# Patient Record
Sex: FEMALE | Race: BLACK OR AFRICAN AMERICAN
[De-identification: names, ages, dates, MRNs, and addresses within clinical notes are randomized per-mention and may not be internally consistent; named-entity substitution may affect disease eponyms.]

---

## 2021-01-25 ENCOUNTER — HOSPITAL ENCOUNTER (EMERGENCY)
Dept: HOSPITAL 53 - M ED | Age: 21
Discharge: HOME | End: 2021-01-25
Payer: COMMERCIAL

## 2021-01-25 VITALS — BODY MASS INDEX: 28.68 KG/M2 | HEIGHT: 64 IN | WEIGHT: 167.99 LBS

## 2021-01-25 VITALS — SYSTOLIC BLOOD PRESSURE: 142 MMHG | DIASTOLIC BLOOD PRESSURE: 75 MMHG

## 2021-01-25 DIAGNOSIS — N30.00: Primary | ICD-10-CM

## 2021-01-25 LAB — HCG UR QL: NEGATIVE

## 2021-02-11 ENCOUNTER — HOSPITAL ENCOUNTER (EMERGENCY)
Dept: HOSPITAL 53 - M ED | Age: 21
Discharge: HOME | End: 2021-02-11
Payer: COMMERCIAL

## 2021-02-11 VITALS — SYSTOLIC BLOOD PRESSURE: 133 MMHG | DIASTOLIC BLOOD PRESSURE: 76 MMHG

## 2021-02-11 VITALS — HEIGHT: 64 IN | WEIGHT: 164.24 LBS | BODY MASS INDEX: 28.04 KG/M2

## 2021-02-11 DIAGNOSIS — Z3A.00: ICD-10-CM

## 2021-02-11 DIAGNOSIS — O23.11: Primary | ICD-10-CM

## 2021-02-11 LAB
CHLAMYDIA DNA AMPLIFICATION: NEGATIVE
N GONORRHOEA RRNA SPEC QL NAA+PROBE: NEGATIVE

## 2021-02-11 NOTE — CCD
Summarization Of Episode

                             Created on: 2021



RACHELL DAMEON TRAVIS

External Reference #: [id number]

: 2000

Sex: Female



Demographics





                          Address                   [address] [place]  [address]

 

                          Home Phone                [phone]

 

                          Preferred Language        English

 

                          Marital Status            

 

                          Ep[employer]opalian Affiliation     [employer]ology

 

                          Race                      Black or 

 

                          Ethnic Group              Not  or 





Author





                          Author                    [facility]

 

                          Organization              [facility]

 

                          Address                   Unknown

 

                          Phone                     Unavailable







Support





                Name            Relationship    Address         Phone

 

                    [facility]             Next Of Kin         [address]

[place]

[place]  [address]                    Unavailable

 

                    SOBEIDA KHALIL      Next Of Kin         [address]



[place]  [address] [phone]

 

                    JAYDA LOVETT Next Of Kin         [address] [address] [place] [address]

[place] 

[place]  [address] [phone]







Care Team Providers





                    Care Team Member Name Role                Phone

 

                    EDWARD LARA MD Unavailable         Unavailable

 

                    EDWARD LARA MD Unavailable         Unavailable



                                  



Re-disclosure Warning

          The records that you are about to access may contain information from 
federally-assisted alcohol or drug abuse programs. If such information is 
present, then the following federally mandated warning applies: This information
has been disclosed to you from records protected by federal confidentiality 
rules (42 CFR part 2). The federal rules prohibit you from making any further 
disclosure of this information unless further disclosure is expressly permitted 
by the written consent of the person to whom it pertains or as otherwise 
permitted by 42 CFR part 2. A general authorization for the release of medical 
or other information is NOT sufficient for this purpose. The Federal rules 
restrict any use of the information to criminally investigate or prosecute any 
alcohol or drug abuse patient.The records that you are about to access may 
contain highly sensitive health information, the redisclosure of which is 
protected by Article 27-F of the [place] Public Health law. If you 
continue you may have access to information: Regarding HIV / AIDS; Provided by 
facilities licensed or operated by the [facility] Office of Mental Health; 
or Provided by the [facility] Office for People With Developmental 
Disabilities. If such information is present, then the following [place] 
mandated warning applies: This information has been disclosed to you from 
confidential records which are protected by state law. State law prohibits you 
from making any further disclosure of this information without the specific 
written consent of the person to whom it pertains, or as otherwise permitted by 
law. Any unauthorized further disclosure in violation of state law may result in
a fine or skilled nursing sentence or both. A general authorization for the release of 
medical or other information is NOT sufficient authorization for further disc
losure.                                                                         
    



Encounters

          



           Encounter  Providers  Location   Date       Indications Data Source(s

)

 

                Outpatient      Attender: EDWARD LARA MD                 2020 01:12:00 PM EDT - 

2020 02:12:00 PM EDT                           [facility]

 

                                        Patient discharged. 



                                                                    



Insurance Providers

          



             Payer name   Policy type / Coverage type Policy ID    Covered party

 ID Covered 

party's relationship to aguilar Policy Aguilar             Plan Information

 

          [facility] [employer]           [id number]           SP             

     [id number]

 

          [facility] [employer] - O/P           [id number]           18            

      [id number]



                                                                                
                 



Problems, Conditions, and Diagnoses

          



           Code       Display Name Description Problem Type Effective Dates Data

 Source(s)

 

           W76113     Pain in left hip Pain in left hip Diagnosis  2020 01

:12:00 PM EDT 

[facility]

 

           H68118     Pain in right hip Pain in right hip Diagnosis  2020 

01:12:00 PM EDT 

[facility]



                                                                                
                 



Results

          



                    ID                  Date                Data Source

 

                    [id number]     2020 10:39:00 [place] [address] PHONE: [phone]

FAX: [phone]  Name .................. : RACHELL MONTELONGO        [facility].................. : [id number] ROOM. ................. :                   
           Number ................... : [id number] Stay type ............. : O/P  
                       Discharge Date......... ... : 20 Admit Date .......
.. : 20                        Admit Phys .................... : MARCO HURT Date of Birth ....... : 2000                     Family Phys 
................... : UNKNOWN CO Phone .................. : [phone]        
       Age ................................ : 20 Film# .................. 
.:[id number]                      Sex ................................. : F  
Unsigned transcriptions are preliminary reports and do not represent a medical 
or legal document     MRI LOWER EXT ANY JT W CONT [id number] COMPLETE:20 
14:45 [facility] [address]                          (REASON FOR PROCESS: PAIN     MRI OF 
THE LEFT HIP WITH CONTRAST:  INDICATION: Pain.  FINDINGS: There is no evidence 
of fracture or dislocation.  There is no abnormal bone marrow signal visualized.
 The joint space is well preserved. No focal hyaline cartilage defect. There is 
no evidence of a labral tear.  No muscular tear or contusion. No evidence of 
ligamentous injury or capsular injury.  IMPRESSION: Essentially normal left hip 
MR arthrogram.   ___________________________________ Electronically Reviewed and
Signed By Conrado Ritter M.D.              , 20 10:39, [facility]  Transcribe 
Initials: TAMMY , Transcribe Date: 20 21:55, Dictation Date:   Copy for: 
MARCO LEON Copy for: [address] [place] [address] REC                                           
                                                      Page 1 of 1   









          Name      Value     Range     Interpretation Code Description Data Nikki

rce(s) Supporting 

Document(s)

 

                                                                       









                    ID                  Date                Data Source

 

                    [id number]     2020 10:38:00 AM EDT [facility] [address] [place] [address] PHONE: [phone]

FAX: [phone]  Name .................. : RACHELL MONTELONGO        Acct 
Number.................. : [id number] ROOM. ................. :                   
          MR Number ................... : [id number] Stay type ............. : O/P  
                       Discharge Date......... ... : 20 Admit Date .......
.. : 20                        Admit Phys .................... : LARA 
COR Date of Birth ....... : 2000                     Family Phys 
................... : UNKNOWN CO Phone .................. : [phone]        
       Age ................................ : 20 Film# .................. 
.:[id number]                      Sex ................................. : F  
Unsigned transcriptions are preliminary reports and do not represent a medical 
or legal document     MRI LOWER EXT ANY JT W CONT R [id number] COMPLETE:20 
15:06 [facility] [id number]                           (REASON FOR PROCESS: PAIN     MRI OF 
THE RIGHT HIP WITH CONTRAST:  INDICATION: Pain.  FINDINGS: There is no evidence 
of fracture or dislocation. There is no abnormal bone marrow signal visualized. 
The joint space is well preserved. No focal hyaline cartilage defect. There is 
no evidence of a labral tear.  No muscular tear or contusion. No evidence of 
ligamentous injury or capsular injury.  IMPRESSION: Essentially normal right hip
MR arthrogram.   ___________________________________ Electronically Reviewed and
Signed By Conrado Ritter M.D.              , 20 10:38, [facility]  Transcribe 
Initials: DZ , Transcribe Date: 20 21:53, Dictation Date:   Copy for: 
MARCO LEON Copy for: [address] [place] [address] REC                                           
                                                      Page 1 of 1   









          Name      Value     Range     Interpretation Code Description Data Nikki

rce(s) Supporting 

Document(s)

 

                                                                       









                    ID                  Date                Data Source

 

                    [id number]     2020 09:51:00 AM EDT [facility] [address] [place] [address] PHONE: [phone]

FAX: [phone]  Name .................. : RACHELL MONTELONGO        Acct 
Number.................. : [id number] ROOM. ................. :                   
          MR Number ................... : [id number] Stay type ............. : O/P  
                       Discharge Date......... ... : 20 Admit Date .......
.. : 20                        Admit Phys .................... : LARA 
COR Date of Birth ....... : 2000                     Family Phys 
................... : UNKNOWN CO Phone .................. : [phone]        
       Age ................................ : 20 Film# .................. 
.:[id number]                      Sex ................................. : F  
Unsigned transcriptions are preliminary reports and do not represent a medical 
or legal document       INJECTION FOR HIP ARTHROGRAM 13746   COMPLETE:20 
13:20                    [id number]                             REASON FOR EXAM: PAIN
      INJECTION FOR HIP ARTHROGRAM 54279   COMPLETE:20 13:20              
     [id number]                             REASON FOR EXAM: PAIN     FLUOROSCOPIC 
EXAMINATION OF THE BILATERAL HIPS FOR ARTHROGRAM, 20:  PROCEDURE: The 
benefits and risks of the examination were discussed with the patient. The 
patient has given informed consent for the procedure.  A time out was performed 
confirming the bilateral hips are the proper hip for today's examination.  The 
skin surface was marked using fluoroscopic guidance.  The skin was prepped and 
dressed in normal sterile fashion.  Superficial and deep Lidocaine 
administration was performed with a 25-gauge needle. A 22- gauge spinal needle 
was then placed and advanced under fluoroscopic guidance. The needle was passed 
into the joint space at which time iodinated contrast was administered to 
confirm proper placement. Approximately 5 cc of iodinated contrast was 
administered. Once the proper placement was confirmed, approximately 10 cc of 
1:200 Gadolinium solution was administered. The needle was then removed. The 
skin was cleansed and bandaged.  No complications were experienced during the 
procedure.  Seven images were obtained and fluoro time was 6 seconds.  
Examination dictated by RONIT Gonzales. Examination was reviewed with 
Nitesh Cortez MD, radiologist at the time of this dictation.               
                                                                                
  Page 1 of 2 [facility] [address] [place] [address] PHONE:
[phone] FAX: [phone]  Name .................. : RACHELLJULIAN MONTELONGO 
      [id number] RO[facility]. ................. :        
                      Number ................... : [id number] Stay type 
............. : O/P                          Discharge Date......... ... : 
20 Admit Date ......... : 20                        Admit Phys 
.................... : MARCO HURT Date of Birth ....... : 2000            
        Family Phys ................... : UNKNOWN CO Phone .................. : 
[phone]                Age ................................ : 20 Film# 
.................. .:[id number]                      Sex 
................................. : F  Unsigned transcriptions are preliminary 
reports and do not represent a medical or legal document       INJECTION FOR HIP
ARTHROGRAM 94454   COMPLETE:20 13:20                    [address]654             
               REASON FOR EXAM: PAIN       INJECTION FOR HIP ARTHROGRAM 62930   
COMPLETE:20 13:20                    01650                             
REASON FOR EXAM: PAIN    ___________________________________ Electronically 
Reviewed and Signed By NITESH CORTEZ MD                        , 20 
09:51, [facility] [place] [facility]  Transcribe Initials: SSR, Transcribe Date: 20 14:05, Dictation
Date:   Copy for: MARCO LEON Copy for: [facility] REC                         
                                                                         Page 2 
of 2   









          Name      Value     Range     Interpretation Code Description Data Nikki

rce(s) Supporting 

Document(s)

 

                                                                       









                    ID                  Date                Data Source

 

                    [id number]     2020 09:51:00 AM EDT [facility] [address]  [place] [address] PHONE: [phone]

FAX: [phone]  Name .................. : RACHELL MONTELONGO        Acct 
Number.................. : [id number] ROOM. ................. :                   
          MR Number ................... : [id number] Stay type ............. : O/P  
                       Discharge Date......... ... : 20 Admit Date .......
.. : 20                        Admit Phys .................... : LARA 
COR Date of Birth ....... : 2000                     Family Phys 
................... : UNKNOWN CO Phone .................. : [phone]        
       Age ................................ : 20 Film# .................. 
.:[id number]                      Sex ................................. : F  
Unsigned transcriptions are preliminary reports and do not represent a medical 
or legal document       INJECTION FOR HIP ARTHROGRAM 57108   COMPLETE:20 
13:20                    10860                             REASON FOR EXAM: PAIN
      INJECTION FOR HIP ARTHROGRAM 76035   COMPLETE:20 13:20              
     [address]240                             REASON FOR EXAM: PAIN     FLUOROSCOPIC 
EXAMINATION OF THE BILATERAL HIPS FOR ARTHROGRAM, 20:  PROCEDURE: The 
benefits and risks of the examination were discussed with the patient. The 
patient has given informed consent for the procedure.  A time out was performed 
confirming the bilateral hips are the proper hip for today's examination.  The 
skin surface was marked using fluoroscopic guidance.  The skin was prepped and 
dressed in normal sterile fashion.  Superficial and deep Lidocaine 
administration was performed with a 25-gauge needle. A 22- gauge spinal needle 
was then placed and advanced under fluoroscopic guidance. The needle was passed 
into the joint space at which time iodinated contrast was administered to 
confirm proper placement. Approximately 5 cc of iodinated contrast was 
administered. Once the proper placement was confirmed, approximately 10 cc of 
1:200 Gadolinium solution was administered. The needle was then removed. The 
skin was cleansed and bandaged.  No complications were experienced during the 
procedure.  Seven images were obtained and fluoro time was 6 seconds.  
Examination dictated by RONIT Gonzales. Examination was reviewed with 
Nitesh Cortez MD, radiologist at the time of this dictation.               
                                                                                
  Page 1 of 2 [facility] [address] [place] [address] PHONE:
[phone] FAX: [phone]  Name .................. : RACHELL MONTELONGO 
      Acct Number.................. : [id number] ROOM. ................. :        
                     MR Number ................... : [id number] Stay type 
............. : O/P                          Discharge Date......... ... : 
20 Admit Date ......... : 20                        Admit Phys 
.................... : LARA COR Date of Birth ....... : 2000            
        Family Phys ................... : UNKNOWN CO Phone .................. : 
[phone]                Age ................................ : 20 Film# 
.................. .:[id number]                      Sex 
................................. : F  Unsigned transcriptions are preliminary 
reports and do not represent a medical or legal document       INJECTION FOR HIP
ARTHROGRAM 06946   COMPLETE:20 13:20                    [address]34             
               REASON FOR EXAM: PAIN       INJECTION FOR HIP ARTHROGRAM 73220   
COMPLETE:20 13:20                    13913                             
REASON FOR EXAM: PAIN    ___________________________________ Electronically 
Reviewed and Signed By NITESH CORTEZ MD                        , 20 
09:51, [facility]  Transcribe Initials: SSR, Transcribe Date: 20 14:05, Dictation
Date:   Copy for: MARCO LEON Copy for: [facility] MED REC                         
                                                                         Page 2 
of 2   









          Name      Value     Range     Interpretation Code Description Data Nikki

rce(s) Supporting 

Document(s)

 

                                                                       







                                        Procedure

## 2021-02-11 NOTE — CCD
Summarization Of Episode

                             Created on: 2021



DAMEON LOVETT

External Reference #: [id number]

: 2000

Sex: Female



Demographics





                          Address                   [address]

[place]  [address]

 

                          Home Phone                [phone]

 

                          Preferred Language        English

 

                          Marital Status            

 

                          Jewish Affiliation     Buddhist

 

                          Race                      Black or 

 

                          Ethnic Group              Not  or 





Author





                          Author                    [facility]

 

                          Organization              [facility]

 

                          Address                   Unknown

 

                          Phone                     Unavailable







Support





                Name            Relationship    Address         Phone

 

                    [facility]             Next Of Kin         [address]

[place]

[place]  [address]                    Unavailable

 

                    SOBEIDA KHALIL      Next Of Kin         [address]



[place]  [address] [phone]

 

                    JAYDA LOVETT Next Of Kin         [address]

[place]  [address] [phone]







Care Team Providers





                    Care Team Member Name Role                Phone

 

                    EDWARD LARA MD Unavailable         Unavailable

 

                    EDWARD LARA MD Unavailable         Unavailable



                                  



Re-disclosure Warning

          The records that you are about to access may contain information from 
federally-assisted alcohol or drug abuse programs. If such information is 
present, then the following federally mandated warning applies: This information
has been disclosed to you from records protected by federal confidentiality 
rules (42 CFR part 2). The federal rules prohibit you from making any further 
disclosure of this information unless further disclosure is expressly permitted 
by the written consent of the person to whom it pertains or as otherwise 
permitted by 42 CFR part 2. A general authorization for the release of medical 
or other information is NOT sufficient for this purpose. The Federal rules 
restrict any use of the information to criminally investigate or prosecute any 
alcohol or drug abuse patient.The records that you are about to access may 
contain highly sensitive health information, the redisclosure of which is 
protected by Article 27-F of the [facility] Public Health law. If you 
continue you may have access to information: Regarding HIV / AIDS; Provided by 
facilities licensed or operated by the [facility] Office of Mental Health; 
or Provided by the [facility] Office for People With Developmental 
Disabilities. If such information is present, then the following [place] 
mandated warning applies: This information has been disclosed to you from 
confidential records which are protected by state law. State law prohibits you 
from making any further disclosure of this information without the specific 
written consent of the person to whom it pertains, or as otherwise permitted by 
law. Any unauthorized further disclosure in violation of state law may result in
a fine or group home sentence or both. A general authorization for the release of 
medical or other information is NOT sufficient authorization for further disc
losure.                                                                         
    



Encounters

          



           Encounter  Providers  Location   Date       Indications Data Source(s

)

 

                Outpatient      Attender: EDWARD LARA MD                 2020 01:12:00 PM EDT - 

2020 02:12:00 PM EDT                           [facility]

 

                                        Patient discharged. 



                                                                    



Insurance Providers

          



             Payer name   Policy type / Coverage type Policy ID    Covered party

 ID Covered 

party's relationship to aguilar Policy Aguilar             Plan Information

 

          [facility] [employer]           [id number]           SP             

     [id number]

 

          [facility] [employer] - O/P           [id number]           [id number]            

      [id number]



                                                                                
                 



Problems, Conditions, and Diagnoses

          



           Code       Display Name Description Problem Type Effective Dates Data

 Source(s)

 

           W11564     Pain in left hip Pain in left hip Diagnosis  2020 01

:12:00 PM EDT 

[facility]

 

           T08545     Pain in right hip Pain in right hip Diagnosis  2020 

01:12:00 PM EDT 

[facility]



                                                                                
                 



Results

          



                    ID                  Date                Data Source

 

                    [id number]     2020 10:39:00 [place] [address] PHONE: [phone]

FAX: [phone]  Name .................. : RACHELL MONTELONGO        Acct 
Number.................. : [id number] ROOM. ................. :                   
           Number ................... : [id number] Stay type ............. : O/P  
                       Discharge Date......... ... : 20 Admit Date .......
.. : 20                        Admit Phys .................... : MARCO HURT Date of Birth ....... : 2000                     Family Phys 
................... : UNKNOWN CO Phone .................. : [phone]        
       Age ................................ : 20 Film# .................. 
.:[id number]                      Sex ................................. : F  
Unsigned transcriptions are preliminary reports and do not represent a medical 
or legal document     MRI LOWER EXT ANY JT W CONT [id number] COMPLETE:20 
14:45 [facility] [address]                          (REASON FOR PROCESS: PAIN     MRI OF 
THE LEFT HIP WITH CONTRAST:  INDICATION: Pain.  FINDINGS: There is no evidence 
of fracture or dislocation.  There is no abnormal bone marrow signal visualized.
 The joint space is well preserved. No focal hyaline cartilage defect. There is 
no evidence of a labral tear.  No muscular tear or contusion. No evidence of 
ligamentous injury or capsular injury.  IMPRESSION: Essentially normal left hip 
MR arthrogram.   ___________________________________ Electronically Reviewed and
Signed By Conrado Ritter M.D.              , 20 10:39, [place]  Transcribe 
Initials: TAMMY , Transcribe Date: 20 21:55, Dictation Date:   Copy for: 
MARCO LEON Copy for: [address] [place] [address] REC                                           
                                                      Page 1 of 1   









          Name      Value     Range     Interpretation Code Description Data Nikki

rce(s) Supporting 

Document(s)

 

                                                                       









                    ID                  Date                Data Source

 

                    [id number]     2020 10:38:00 AM EDT [facility] [address] [place] [address] PHONE: [phone]

FAX: [phone]  Name .................. : RACHELL MONTELONGO        Acct 
Number.................. : [id number] ROOM. ................. :                   
          MR Number ................... : [id number] Stay type ............. : O/P  
                       Discharge Date......... ... : 20 Admit Date .......
.. : 20                        Admit Phys .................... : LARA 
COR Date of Birth ....... : 2000                     Family Phys 
................... : UNKNOWN CO Phone .................. : [phone]        
       Age ................................ : 20 Film# .................. 
.:[id number]                      Sex ................................. : F  
Unsigned transcriptions are preliminary reports and do not represent a medical 
or legal document     MRI LOWER EXT ANY JT W CONT R [id number] COMPLETE:20 
15:06 [facility] [address]                           (REASON FOR PROCESS: PAIN     MRI OF 
THE RIGHT HIP WITH CONTRAST:  INDICATION: Pain.  FINDINGS: There is no evidence 
of fracture or dislocation. There is no abnormal bone marrow signal visualized. 
The joint space is well preserved. No focal hyaline cartilage defect. There is 
no evidence of a labral tear.  No muscular tear or contusion. No evidence of 
ligamentous injury or capsular injury.  IMPRESSION: Essentially normal right hip
MR arthrogram.   ___________________________________ Electronically Reviewed and
Signed By Conrado Ritter M.D.              , 20 10:38, [place]  Transcribe 
Initials: DZ , Transcribe Date: 20 21:53, Dictation Date:   Copy for: 
MARCO LEON Copy for: [address] [place] [address] REC                                           
                                                      Page 1 of 1   









          Name      Value     Range     Interpretation Code Description Data Nikki

rce(s) Supporting 

Document(s)

 

                                                                       









                    ID                  Date                Data Source

 

                    [id number]     2020 09:51:00 AM EDT [facility] [address] [place] [address] PHONE: [phone]

FAX: [phone]  Name .................. : RACHELL MONTELONGO        Acct 
Number.................. : [id number] ROOM. ................. :                   
          MR Number ................... : [id number] Stay type ............. : O/P  
                       Discharge Date......... ... : 20 Admit Date .......
.. : 20                        Admit Phys .................... : LARA 
LICHA Date of Birth ....... : 2000                     Family Phys 
................... : UNKNOWN CO Phone .................. : [phone]        
       Age ................................ : 20 Film# .................. 
.:[id number]                      Sex ................................. : F  
Unsigned transcriptions are preliminary reports and do not represent a medical 
or legal document       INJECTION FOR HIP ARTHROGRAM 00303   COMPLETE:20 
13:20                    27351                             REASON FOR EXAM: PAIN
      INJECTION FOR HIP ARTHROGRAM 17300   COMPLETE:20 13:20              
     [id number]                             REASON FOR EXAM: PAIN     FLUOROSCOPIC 
EXAMINATION OF THE BILATERAL HIPS FOR ARTHROGRAM, 20:  PROCEDURE: The 
benefits and risks of the examination were discussed with the patient. The 
patient has given informed consent for the procedure.  A time out was performed 
confirming the bilateral hips are the proper hip for today's examination.  The 
skin surface was marked using fluoroscopic guidance.  The skin was prepped and 
dressed in normal sterile fashion.  Superficial and deep Lidocaine 
administration was performed with a 25-gauge needle. A 22- gauge spinal needle 
was then placed and advanced under fluoroscopic guidance. The needle was passed 
into the joint space at which time iodinated contrast was administered to 
confirm proper placement. Approximately 5 cc of iodinated contrast was 
administered. Once the proper placement was confirmed, approximately 10 cc of 
1:200 Gadolinium solution was administered. The needle was then removed. The 
skin was cleansed and bandaged.  No complications were experienced during the 
procedure.  Seven images were obtained and fluoro time was 6 seconds.  
Examination dictated by RONIT Gonzales. Examination was reviewed with 
Nitesh Cortez MD, radiologist at the time of this dictation.               
                                                                                
  Page 1 of 2 [place] [address] PHONE:
[phone] FAX: [phone]  Name .................. : RACHELLJULIAN MONTELONGO 
      [id number]. ................. :        
                      Number ................... : [id number] Stay type 
............. : O/P                          Discharge Date......... ... : 
20 Admit Date ......... : 20                        Admit Phys 
.................... : MARCO HURT Date of Birth ....... : 2000            
        Family Phys ................... : UNKNOWN CO Phone .................. : 
[phone]                Age ................................ : 20 Film# 
.................. .:[id number]                      Sex 
................................. : F  Unsigned transcriptions are preliminary 
reports and do not represent a medical or legal document       INJECTION FOR HIP
ARTHROGRAM 70094   COMPLETE:20 13:20                    98092             
               REASON FOR EXAM: PAIN       INJECTION FOR HIP ARTHROGRAM 57569   
COMPLETE:20 13:20                    71372                             
REASON FOR EXAM: PAIN    ___________________________________ Electronically 
Reviewed and Signed By NITESH CORTEZ MD                        , 20 
09:51, [facility]  Transcribe Initials: SSR, Transcribe Date: 20 14:05, Dictation
Date:   Copy for: MARCO LEON Copy for: [address] [place] [address] REC                         
                                                                         Page 2 
of 2   









          Name      Value     Range     Interpretation Code Description Data Nikki

rce(s) Supporting 

Document(s)

 

                                                                       









                    ID                  Date                Data Source

 

                    [id number]     2020 09:51:00 AM EDT [facility] [address]  [place] [address] PHONE: [phone]

FAX: [phone]  Name .................. : RACHELL DAMEON MONTELONGO        Acct 
Number.................. : [id number] ROOM. ................. :                   
          MR Number ................... : [id number] Stay type ............. : O/P  
                       Discharge Date......... ... : 20 Admit Date .......
.. : 20                        Admit Phys .................... : LARA 
COR Date of Birth ....... : 2000                     Family Phys 
................... : UNKNOWN CO Phone .................. : [phone]        
       Age ................................ : 20 Film# .................. 
.:[id number]                      Sex ................................. : F  
Unsigned transcriptions are preliminary reports and do not represent a medical 
or legal document       INJECTION FOR HIP ARTHROGRAM 45754   COMPLETE:20 
13:20                    40641                             REASON FOR EXAM: PAIN
      INJECTION FOR HIP ARTHROGRAM 04968   COMPLETE:20 13:20              
     90983                             REASON FOR EXAM: PAIN     FLUOROSCOPIC 
EXAMINATION OF THE BILATERAL HIPS FOR ARTHROGRAM, 20:  PROCEDURE: The 
benefits and risks of the examination were discussed with the patient. The 
patient has given informed consent for the procedure.  A time out was performed 
confirming the bilateral hips are the proper hip for today's examination.  The 
skin surface was marked using fluoroscopic guidance.  The skin was prepped and 
dressed in normal sterile fashion.  Superficial and deep Lidocaine 
administration was performed with a 25-gauge needle. A 22- gauge spinal needle 
was then placed and advanced under fluoroscopic guidance. The needle was passed 
into the joint space at which time iodinated contrast was administered to 
confirm proper placement. Approximately 5 cc of iodinated contrast was 
administered. Once the proper placement was confirmed, approximately 10 cc of 
1:200 Gadolinium solution was administered. The needle was then removed. The 
skin was cleansed and bandaged.  No complications were experienced during the 
procedure.  Seven images were obtained and fluoro time was 6 seconds.  
Examination dictated by RONIT Gonzales. Examination was reviewed with 
Nitesh Cortez MD, radiologist at the time of this dictation.               
                                                                                
  Page 1 of 2 [facility] [address] [place] [address] PHONE:
[phone] FAX: [phone]  Name .................. : RACHELL MONTELONGO 
      Acct Number.................. : [id number] ROOM. ................. :        
                     MR Number ................... : [id number] Stay type 
............. : O/P                          Discharge Date......... ... : 
20 Admit Date ......... : 20                        Admit Phys 
.................... : MARCO HURT Date of Birth ....... : 2000            
        Family Phys ................... : UNKNOWN CO Phone .................. : 
[phone]                Age ................................ : 20 Film# 
.................. .:[id number]                      Sex 
................................. : F  Unsigned transcriptions are preliminary 
reports and do not represent a medical or legal document       INJECTION FOR HIP
ARTHROGRAM 59391   COMPLETE:20 13:20                    24908             
               REASON FOR EXAM: PAIN       INJECTION FOR HIP ARTHROGRAM 76912   
COMPLETE:20 13:20                    10341                             
REASON FOR EXAM: PAIN    ___________________________________ Electronically 
Reviewed and Signed By NITESH CORTEZ MD                        , 20 
09:51, [facility]  Transcribe Initials: SSR, Transcribe Date: 20 14:05, Dictation
Date:   Copy for: MARCO LEON Copy for: [address] [place] [address] REC                         
                                                                         Page 2 
of 2   









          Name      Value     Range     Interpretation Code Description Data Nikki

rce(s) Supporting 

Document(s)

 

                                                                       







                                        Procedure

## 2021-02-15 ENCOUNTER — HOSPITAL ENCOUNTER (EMERGENCY)
Dept: HOSPITAL 53 - M ED | Age: 21
Discharge: HOME | End: 2021-02-15
Payer: COMMERCIAL

## 2021-02-15 VITALS — HEIGHT: 64 IN | BODY MASS INDEX: 29.17 KG/M2 | WEIGHT: 170.86 LBS

## 2021-02-15 VITALS — DIASTOLIC BLOOD PRESSURE: 70 MMHG | SYSTOLIC BLOOD PRESSURE: 128 MMHG
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
## 2021-02-15 DIAGNOSIS — R10.30: Primary | ICD-10-CM

## 2021-02-15 LAB
BASOPHILS # BLD AUTO: 0 10^3/UL (ref 0–0.2)
BASOPHILS NFR BLD AUTO: 0.4 % (ref 0–1)
EOSINOPHIL # BLD AUTO: 0.1 10^3/UL (ref 0–0.5)
EOSINOPHIL NFR BLD AUTO: 0.6 % (ref 0–3)
HCT VFR BLD AUTO: 37.8 % (ref 36–47)
HGB BLD-MCNC: 12.2 G/DL (ref 12–15.5)
LYMPHOCYTES # BLD AUTO: 1.7 10^3/UL (ref 1.5–5)
LYMPHOCYTES NFR BLD AUTO: 20.6 % (ref 24–44)
MCH RBC QN AUTO: 28.4 PG (ref 27–33)
MCHC RBC AUTO-ENTMCNC: 32.3 G/DL (ref 32–36.5)
MCV RBC AUTO: 87.9 FL (ref 80–96)
MONOCYTES # BLD AUTO: 0.6 10^3/UL (ref 0–0.8)
MONOCYTES NFR BLD AUTO: 7.5 % (ref 0–8)
NEUTROPHILS # BLD AUTO: 6 10^3/UL (ref 1.5–8.5)
NEUTROPHILS NFR BLD AUTO: 70.5 % (ref 36–66)
PLATELET # BLD AUTO: 257 10^3/UL (ref 150–450)
RBC # BLD AUTO: 4.3 10^6/UL (ref 4–5.4)
WBC # BLD AUTO: 8.5 10^3/UL (ref 4–10)

## 2021-02-15 NOTE — CCD
Summarization Of Episode

                             Created on: 02/15/2021



RACHELL DAMEON TRAVIS

External Reference #: [id number]

: 2000

Sex: Female



Demographics





                          Address                   [address] [place]  [address]

 

                          Home Phone                [phone]

 

                          Preferred Language        English

 

                          Marital Status            

 

                          Anabaptism Affiliation     Anabaptism

 

                          Race                      Black or 

 

                          Ethnic Group              Not  or 





Author





                          Author                    [facility]

 

                          Organization              [facility]

 

                          Address                   Unknown

 

                          Phone                     Unavailable







Support





                Name            Relationship    Address         Phone

 

                    [facility]             Next Of Kin         [address]

[place]

[place]  [address]                    Unavailable

 

                    SOBEIDA KHALIL      Next Of Kin         [address]



[place]  [address] [phone]

 

                    JAYDA LOVETT Next Of Kin         [address] [address] [place] [address]

[place] 

[place]  [address] [phone]







Care Team Providers





                    Care Team Member Name Role                Phone

 

                    EDWARD LARA MD Unavailable         Unavailable

 

                    EDWARD LARA MD Unavailable         Unavailable



                                  



Re-disclosure Warning

          The records that you are about to access may contain information from 
federally-assisted alcohol or drug abuse programs. If such information is 
present, then the following federally mandated warning applies: This information
has been disclosed to you from records protected by federal confidentiality 
rules (42 CFR part 2). The federal rules prohibit you from making any further 
disclosure of this information unless further disclosure is expressly permitted 
by the written consent of the person to whom it pertains or as otherwise 
permitted by 42 CFR part 2. A general authorization for the release of medical 
or other information is NOT sufficient for this purpose. The Federal rules 
restrict any use of the information to criminally investigate or prosecute any 
alcohol or drug abuse patient.The records that you are about to access may 
contain highly sensitive health information, the redisclosure of which is 
protected by Article 27-F of the [place] Public Health law. If you 
continue you may have access to information: Regarding HIV / AIDS; Provided by 
facilities licensed or operated by the [facility] Office of Mental Health; 
or Provided by the [facility] Office for People With Developmental 
Disabilities. If such information is present, then the following [place] 
mandated warning applies: This information has been disclosed to you from 
confidential records which are protected by state law. State law prohibits you 
from making any further disclosure of this information without the specific 
written consent of the person to whom it pertains, or as otherwise permitted by 
law. Any unauthorized further disclosure in violation of state law may result in
a fine or snf sentence or both. A general authorization for the release of 
medical or other information is NOT sufficient authorization for further disc
losure.                                                                         
    



Encounters

          



           Encounter  Providers  Location   Date       Indications Data Source(s

)

 

                Outpatient      Attender: EDWARD LARA MD                 2020 01:12:00 PM EDT - 

2020 02:12:00 PM EDT                           [facility]

 

                                        Patient discharged. 



                                                                                
       



Medications

          



          Medication Brand Name Start Date Product Form Dose      Route     Admi

nistrative 

Instructions Pharmacy Instructions Status     Indications Reaction   Description

 Data 

Source(s)

 

           Cephalexin 500 MG Oral Capsule CEPHALEXIN 2021 12:00:00 AM EST 

capsule    20         

                    TAKE ONE CAPSULE BY MOUTH TWICE A DAY TAKE ONE CAPSULE BY MO

UTH TWICE A DAY 

SOLD: 2021                                                 [facility]



                                                                    



Insurance Providers

          



             Payer name   Policy type / Coverage type Policy ID    Covered party

 ID Covered 

party's relationship to aguilar Policy Aguilar             Plan Information

 

          [facility] [employer]           [id number]           SP             

     [id number]

 

          [facility] [employer] - O/P           [id number]           [id number]            

      [id number]



                                                                                
                 



Problems, Conditions, and Diagnoses

          



           Code       Display Name Description Problem Type Effective Dates Data

 Source(s)

 

           O19675     Pain in left hip Pain in left hip [facility]  [facility]

 

           C47186     Pain in right hip Pain in right hip Diagnosis  2020 

01:12:00 PM EDT 

[facility]



                                                                                
                 



Results

          



                    ID                  Date                Data Source

 

                    [id number]         2021 01:49:00 PM EST [facility]









          Name      Value     Range     Interpretation Code Description Data Nikki

rce(s) Supporting 

Document(s)

 

          SARS coronavirus 2 RNA Not Detected                               [facility]

[place]     

 

                                        This lab was ordered by [facility] 

Laboratory and reported by [facility]. 









                    ID                  Date                Data Source

 

                    [id number]     2020 10:39:00 AM EDT [facility] [address] [place] [address] PHONE: [phone]

FAX: [phone]  Name .................. : RACHELL MONTELONGO        Acct 
Number.................. : [id number] ROOM. ................. :                   
          MR Number ................... : [id number] Stay type ............. : O/P  
                       Discharge Date......... ... : 20 Admit Date .......
.. : 20                        Admit Phys .................... : LARA 
COR Date of Birth ....... : 2000                     Family Phys 
................... : UNKNOWN CO Phone .................. : [phone]        
       Age ................................ : 20 Film# .................. 
.:[id number]                      Sex ................................. : F  
Unsigned transcriptions are preliminary reports and do not represent a medical 
or legal document     MRI LOWER EXT ANY JT W CONT [id number] COMPLETE:20 
14:45 [facility] [address]                          (REASON FOR PROCESS: PAIN     MRI OF 
THE LEFT HIP WITH CONTRAST:  INDICATION: Pain.  FINDINGS: There is no evidence 
of fracture or dislocation.  There is no abnormal bone marrow signal visualized.
 The joint space is well preserved. No focal hyaline cartilage defect. There is 
no evidence of a labral tear.  No muscular tear or contusion. No evidence of 
ligamentous injury or capsular injury.  IMPRESSION: Essentially normal left hip 
MR arthrogram.   ___________________________________ Electronically Reviewed and
Signed By Conrado Ritter M.D.              , 20 10:39, [facility]  Transcribe 
Initials: DZ , Transcribe Date: 20 21:55, Dictation Date:   Copy for: 
MARCO LEON Copy for: [facility] REC                                           
                                                      Page 1 of 1   









          Name      Value     Range     Interpretation Code Description Data Nikki

rce(s) Supporting 

Document(s)

 

                                                                       









                    ID                  Date                Data Source

 

                    [id number]     2020 10:38:00 [place] [address] PHONE: [phone]

FAX: [phone]  Name .................. : RACHELL MONTESINOSILLE JAYDA        Acct 
Number.................. : [id number] ROOM. ................. :                   
           Number ................... : [id number] Stay type ............. : O/P  
                       Discharge Date......... ... : 20 Admit Date .......
.. : 20                        Admit Phys .................... : MARCO HURT Date of Birth ....... : 2000                     Family Phys 
................... : UNKNOWN CO Phone .................. : [phone]        
       Age ................................ : 20 Film# .................. 
.:[id number]                      Sex ................................. : F  
Unsigned transcriptions are preliminary reports and do not represent a medical 
or legal document     MRI LOWER EXT ANY JT W CONT R [id number] COMPLETE:20 
15:06 [facility] [address]                           (REASON FOR PROCESS: PAIN     MRI OF 
THE RIGHT HIP WITH CONTRAST:  INDICATION: Pain.  FINDINGS: There is no evidence 
of fracture or dislocation. There is no abnormal bone marrow signal visualized. 
The joint space is well preserved. No focal hyaline cartilage defect. There is 
no evidence of a labral tear.  No muscular tear or contusion. No evidence of 
ligamentous injury or capsular injury.  IMPRESSION: Essentially normal right hip
MR arthrogram.   ___________________________________ Electronically Reviewed and
Signed By Conrado Ritter M.D.              , 20 10:38, [facility]  Transcribe 
Initials: TAMMY , Transcribe Date: 20 21:53, Dictation Date:   Copy for: 
MARCO LEON Copy for: [address] [place] [address] REC                                           
                                                      Page 1 of 1   









          Name      Value     Range     Interpretation Code Description Data Nikki

rce(s) Supporting 

Document(s)

 

                                                                       









                    ID                  Date                Data Source

 

                    [id number]     2020 09:51:00 [place] [address] PHONE: [phone]

FAX: [phone]  Name .................. : RACHELL MONTELONGO        Acct 
Number.................. : [id number] ROOM. ................. :                   
           Number ................... : [id number] Stay type ............. : O/P  
                       Discharge Date......... ... : 20 Admit Date .......
.. : 20                        Admit Phys .................... : MARCO HURT Date of Birth ....... : 2000                     Family Phys 
................... : UNKNOWN CO Phone .................. : [phone]        
       Age ................................ : 20 Film# .................. 
.:[id number]                      Sex ................................. : F  
Unsigned transcriptions are preliminary reports and do not represent a medical 
or legal document       INJECTION FOR HIP ARTHROGRAM 60956   COMPLETE:20 
13:20                    [id number]                             REASON FOR EXAM: PAIN
      INJECTION FOR HIP ARTHROGRAM 96153   COMPLETE:20 13:20              
     [id number]                             REASON FOR EXAM: PAIN     FLUOROSCOPIC 
EXAMINATION OF THE BILATERAL HIPS FOR ARTHROGRAM, 20:  PROCEDURE: The 
benefits and risks of the examination were discussed with the patient. The 
patient has given informed consent for the procedure.  A time out was performed 
confirming the bilateral hips are the proper hip for today's examination.  The 
skin surface was marked using fluoroscopic guidance.  The skin was prepped and 
dressed in normal sterile fashion.  Superficial and deep Lidocaine 
administration was performed with a 25-gauge needle. A 22- gauge spinal needle 
was then placed and advanced under fluoroscopic guidance. The needle was passed 
into the joint space at which time iodinated contrast was administered to 
confirm proper placement. Approximately 5 cc of iodinated contrast was 
administered. Once the proper placement was confirmed, approximately 10 cc of 
1:200 Gadolinium solution was administered. The needle was then removed. The 
skin was cleansed and bandaged.  No complications were experienced during the 
procedure.  Seven images were obtained and fluoro time was 6 seconds.  
Examination dictated by RONIT Gonzales. Examination was reviewed with 
Nitesh Cortez MD, radiologist at the time of this dictation.               
                                                                                
  [address] [place] [address] PHONE:
[phone] FAX: [phone]  Name .................. : RACHELL MONTELONGO 
      Acct Number.................. : [id number] ROOM. ................. :        
                     MR Number ................... : [id number] Stay type 
............. : O/P                          Discharge Date......... ... : 
20 Admit Date ......... : 20                        Admit Phys 
.................... : MARCO HURT Date of Birth ....... : 2000            
        Family Phys ................... : UNKNOWN CO Phone .................. : 
[phone]                Age ................................ : 20 Film# 
.................. .:[id number]                      Sex 
................................. : F  Unsigned transcriptions are preliminary 
reports and do not represent a medical or legal document       INJECTION FOR HIP
ARTHROGRAM 47524   COMPLETE:20 13:20                    [id number]             
               REASON FOR EXAM: PAIN       INJECTION FOR HIP ARTHROGRAM 76425   
COMPLETE:20 13:20                    24595                             
REASON FOR EXAM: PAIN    ___________________________________ Electronically 
Reviewed and Signed By NITESH CORTEZ MD                        , 20 
09:51, [facility]  Transcribe Initials: SSR, Transcribe Date: 20 14:05, Dictation
Date:   Copy for: LARA EDWARD Copy for: [facility] REC                         
                                                                         Page 2 
of 2   









          Name      Value     Range     Interpretation Code Description Data Nikki

rce(s) Supporting 

Document(s)

 

                                                                       









                    ID                  Date                Data Source

 

                    [id number]     2020 09:51:00 AM EDT [facility] [address] [place] [address] PHONE: [phone]

FAX: [phone]  Name .................. : RACHELL MONTELONGO        Acct 
Number.................. : [id number] ROOM. ................. :                   
          MR Number ................... : [id number] Stay type ............. : O/P  
                       Discharge Date......... ... : 20 Admit Date .......
.. : 20                        Admit Phys .................... : LARA 
COR Date of Birth ....... : 2000                     Family Phys 
................... : UNKNOWN CO Phone .................. : [phone]        
       Age ................................ : 20 Film# .................. 
.:[id number]                      Sex ................................. : F  
Unsigned transcriptions are preliminary reports and do not represent a medical 
or legal document       INJECTION FOR HIP ARTHROGRAM 37234   COMPLETE:20 
13:20                    32209                             REASON FOR EXAM: PAIN
      INJECTION FOR HIP ARTHROGRAM 97523   COMPLETE:20 13:20              
     [id number]                             REASON FOR EXAM: PAIN     FLUOROSCOPIC 
EXAMINATION OF THE BILATERAL HIPS FOR ARTHROGRAM, 20:  PROCEDURE: The 
benefits and risks of the examination were discussed with the patient. The 
patient has given informed consent for the procedure.  A time out was performed 
confirming the bilateral hips are the proper hip for today's examination.  The 
skin surface was marked using fluoroscopic guidance.  The skin was prepped and 
dressed in normal sterile fashion.  Superficial and deep Lidocaine 
administration was performed with a 25-gauge needle. A 22- gauge spinal needle 
was then placed and advanced under fluoroscopic guidance. The needle was passed 
into the joint space at which time iodinated contrast was administered to 
confirm proper placement. Approximately 5 cc of iodinated contrast was 
administered. Once the proper placement was confirmed, approximately 10 cc of 
1:200 Gadolinium solution was administered. The needle was then removed. The 
skin was cleansed and bandaged.  No complications were experienced during the 
procedure.  Seven images were obtained and fluoro time was 6 seconds.  
Examination dictated by RONIT Gonzales. Examination was reviewed with 
Nitesh Cortez MD, radiologist at the time of this dictation.               
                                                                                
  [address] [place] [address] PHONE:
[phone] FAX: [phone]  Name .................. : RACHELLJULIAN MONTELONGO 
      Acct Number.................. : [id number] ROOM. ................. :        
                     MR Number ................... : [id number] Stay type 
............. : O/P                          Discharge Date......... ... : 
20 Admit Date ......... : 20                        Admit Phys 
.................... : MARCO HURT Date of Birth ....... : 2000            
        Family Phys ................... : UNKNOWN CO Phone .................. : 
[phone]                Age ................................ : 20 Film# 
.................. .:[id number]                      Sex 
................................. : F  Unsigned transcriptions are preliminary 
reports and do not represent a medical or legal document       INJECTION FOR HIP
ARTHROGRAM 13578   COMPLETE:20 13:20                    85044             
               REASON FOR EXAM: PAIN       INJECTION FOR HIP ARTHROGRAM 60452   
COMPLETE:20 13:20                    93162                             
REASON FOR EXAM: PAIN    ___________________________________ Electronically 
Reviewed and Signed By NITESH CORTEZ MD                        , 20 
09:51, [facility]  Transcribe Initials: SSR, Transcribe Date: 20 14:05, Dictation
Date:   Copy for: MARCO LEON Copy for: [facility] MED REC                         
                                                                         Page 2 
of 2   









          Name      Value     Range     Interpretation Code Description Data Nikki

rce(s) Supporting 

Document(s)

 

                                                                       







                                        Procedure

## 2021-02-15 NOTE — CCD
Summarization Of Episode

                             Created on: 02/15/2021



RACHELL DAMEON TRAVIS

External Reference #: [id number]

: 2000

Sex: Female



Demographics





                          Address                   [address] [place]  [address]

 

                          Home Phone                [phone]

 

                          Preferred Language        English

 

                          Marital Status            

 

                          Holiness Affiliation     Mormonism

 

                          Race                      Black or 

 

                          Ethnic Group              Not  or 





Author





                          Author                    [facility]

 

                          Organization              [facility]

 

                          Address                   Unknown

 

                          Phone                     Unavailable







Support





                Name            Relationship    Address         Phone

 

                    [facility]             Next Of Kin         [address]

[place]

[place]  [address]                    Unavailable

 

                    SOBEIDA KHALIL      Next Of Kin         [address]



[place]  [address] [phone]

 

                    JAYDA LOVETT Next Of Kin         [address] [address] [place] [address]

[place] 

[place]  [address] [phone]







Care Team Providers





                    Care Team Member Name Role                Phone

 

                    EDWARD LARA MD Unavailable         Unavailable

 

                    EDWARD LARA MD Unavailable         Unavailable



                                  



Re-disclosure Warning

          The records that you are about to access may contain information from 
federally-assisted alcohol or drug abuse programs. If such information is 
present, then the following federally mandated warning applies: This information
has been disclosed to you from records protected by federal confidentiality 
rules (42 CFR part 2). The federal rules prohibit you from making any further 
disclosure of this information unless further disclosure is expressly permitted 
by the written consent of the person to whom it pertains or as otherwise 
permitted by 42 CFR part 2. A general authorization for the release of medical 
or other information is NOT sufficient for this purpose. The Federal rules 
restrict any use of the information to criminally investigate or prosecute any 
alcohol or drug abuse patient.The records that you are about to access may 
contain highly sensitive health information, the redisclosure of which is 
protected by Article 27-F of the [place] Public Health law. If you 
continue you may have access to information: Regarding HIV / AIDS; Provided by 
facilities licensed or operated by the [facility] Office of Mental Health; 
or Provided by the [facility] Office for People With Developmental 
Disabilities. If such information is present, then the following [place] 
mandated warning applies: This information has been disclosed to you from 
confidential records which are protected by state law. State law prohibits you 
from making any further disclosure of this information without the specific 
written consent of the person to whom it pertains, or as otherwise permitted by 
law. Any unauthorized further disclosure in violation of state law may result in
a fine or intermediate sentence or both. A general authorization for the release of 
medical or other information is NOT sufficient authorization for further disc
losure.                                                                         
    



Encounters

          



           Encounter  Providers  Location   Date       Indications Data Source(s

)

 

                Outpatient      Attender: EDWARD LARA MD                 2020 01:12:00 PM EDT - 

2020 02:12:00 PM EDT                           [facility]

 

                                        Patient discharged. 



                                                                                
       



Medications

          



          Medication Brand Name Start Date Product Form Dose      Route     Admi

nistrative 

Instructions Pharmacy Instructions Status     Indications Reaction   Description

 Data 

Source(s)

 

           Cephalexin 500 MG Oral Capsule CEPHALEXIN 2021 12:00:00 AM EST 

capsule    20         

                    TAKE ONE CAPSULE BY MOUTH TWICE A DAY TAKE ONE CAPSULE BY MO

UTH TWICE A DAY 

SOLD: 2021                                                 [facility]



                                                                    



Insurance Providers

          



             Payer name   Policy type / Coverage type Policy ID    Covered party

 ID Covered 

party's relationship to aguilar Policy Aguilar             Plan Information

 

          [facility] [employer]           [id number]           SP             

     [id number]

 

          [facility] HUMA[employer] - O/P           [id number]           [id number]            

      [id number]



                                                                                
                 



Problems, Conditions, and Diagnoses

          



           Code       Display Name Description Problem Type Effective Dates Data

 Source(s)

 

           F72297     Pain in left hip Pain in left hip Diagnosis  2020 01

:12:00 PM EDT 

[facility]

 

           I39968     Pain in right hip Pain in right hip Diagnosis  2020 

01:12:00 PM EDT 

[facility]



                                                                                
                 



Results

          



                    ID                  Date                Data Source

 

                    [id number]         2021 01:49:00 PM [facility]









          Name      Value     Range     Interpretation Code Description Data Nikki

rce(s) Supporting 

Document(s)

 

          SARS coronavirus 2 RNA Not Detected                               [facility]

[place]     

 

                                        This lab was ordered by [facility] 

Laboratory and reported by [facility]. 









                    ID                  Date                Data Source

 

                    [id number]     2020 10:39:00 AM EDT [facility] [address] [place] [address] PHONE: [phone]

FAX: [phone]  Name .................. : RACHELL MONTELONGO        Acct 
Number.................. : [id number] ROOM. ................. :                   
          MR Number ................... : [id number] Stay type ............. : O/P  
                       Discharge Date......... ... : 20 Admit Date .......
.. : 20                        Admit Phys .................... : LARA 
COR Date of Birth ....... : 2000                     Family Phys 
................... : UNKNOWN CO Phone .................. : [phone]        
       Age ................................ : 20 Film# .................. 
.:[id number]                      Sex ................................. : F  
Unsigned transcriptions are preliminary reports and do not represent a medical 
or legal document     MRI LOWER EXT ANY JT W CONT [id number] COMPLETE:20 
14:45 [facility] [address]                          (REASON FOR PROCESS: PAIN     MRI OF 
THE LEFT HIP WITH CONTRAST:  INDICATION: Pain.  FINDINGS: There is no evidence 
of fracture or dislocation.  There is no abnormal bone marrow signal visualized.
 The joint space is well preserved. No focal hyaline cartilage defect. There is 
no evidence of a labral tear.  No muscular tear or contusion. No evidence of 
ligamentous injury or capsular injury.  IMPRESSION: Essentially normal left hip 
MR arthrogram.   ___________________________________ Electronically Reviewed and
Signed By Conrado Ritter M.D.              , 20 10:39, [place]  Transcribe 
Initials: DZ , Transcribe Date: 20 21:55, Dictation Date:   Copy for: 
MARCO LEON Copy for: [facility] REC                                           
                                                      Page 1 of 1   









          Name      Value     Range     Interpretation Code Description Data Nikki

rce(s) Supporting 

Document(s)

 

                                                                       









                    ID                  Date                Data Source

 

                    [id number]     2020 10:38:00 AM EDT [facility] [address] [place] [address] PHONE: [phone]

FAX: [phone]  Name .................. : RACHELL MONTELONGO        Acct 
Number.................. : [id number] ROOM. ................. :                   
           Number ................... : [id number] Stay type ............. : O/P  
                       Discharge Date......... ... : 20 Admit Date .......
.. : 20                        Admit Phys .................... : LARA 
LICHA Date of Birth ....... : 2000                     Family Phys 
................... : UNKNOWN CO Phone .................. : [phone]        
       Age ................................ : 20 Film# .................. 
.:[id number]                      Sex ................................. : F  
Unsigned transcriptions are preliminary reports and do not represent a medical 
or legal document     MRI LOWER EXT ANY JT W CONT R [id number] COMPLETE:20 
15:06 [facility] [address]                           (REASON FOR PROCESS: PAIN     MRI OF 
THE RIGHT HIP WITH CONTRAST:  INDICATION: Pain.  FINDINGS: There is no evidence 
of fracture or dislocation. There is no abnormal bone marrow signal visualized. 
The joint space is well preserved. No focal hyaline cartilage defect. There is 
no evidence of a labral tear.  No muscular tear or contusion. No evidence of 
ligamentous injury or capsular injury.  IMPRESSION: Essentially normal right hip
MR arthrogram.   ___________________________________ Electronically Reviewed and
Signed By Conrado Ritter M.D.              , 20 10:38, [facility]  Transcribe 
Initials: TAMMY , Transcribe Date: 20 21:53, Dictation Date:   Copy for: 
MARCO LEON Copy for: [address] [place] [address] REC                                           
                                                      Page 1 of 1   









          Name      Value     Range     Interpretation Code Description Data Nikki

rce(s) Supporting 

Document(s)

 

                                                                       









                    ID                  Date                Data Source

 

                    [id number]     2020 09:51:00 [place] [address] PHONE: [phone]

FAX: [phone]  Name .................. : RACHELL MONTELONGO        Acct 
Number.................. : [id number] ROOM. ................. :                   
           Number ................... : [id number] Stay type ............. : O/P  
                       Discharge Date......... ... : 20 Admit Date .......
.. : 20                        Admit Phys .................... : MARCO HURT Date of Birth ....... : 2000                     Family Phys 
................... : UNKNOWN CO Phone .................. : [phone]        
       Age ................................ : 20 Film# .................. 
.:[id number]                      Sex ................................. : F  
Unsigned transcriptions are preliminary reports and do not represent a medical 
or legal document       INJECTION FOR HIP ARTHROGRAM [id number]   COMPLETE:20 
13:20                    [id number]                             REASON FOR EXAM: PAIN
      INJECTION FOR HIP ARTHROGRAM 63971   COMPLETE:20 13:20              
     [id number]                             REASON FOR EXAM: PAIN     FLUOROSCOPIC 
EXAMINATION OF THE BILATERAL HIPS FOR ARTHROGRAM, 20:  PROCEDURE: The 
benefits and risks of the examination were discussed with the patient. The 
patient has given informed consent for the procedure.  A time out was performed 
confirming the bilateral hips are the proper hip for today's examination.  The 
skin surface was marked using fluoroscopic guidance.  The skin was prepped and 
dressed in normal sterile fashion.  Superficial and deep Lidocaine 
administration was performed with a 25-gauge needle. A 22- gauge spinal needle 
was then placed and advanced under fluoroscopic guidance. The needle was passed 
into the joint space at which time iodinated contrast was administered to 
confirm proper placement. Approximately 5 cc of iodinated contrast was 
administered. Once the proper placement was confirmed, approximately 10 cc of 
1:200 Gadolinium solution was administered. The needle was then removed. The 
skin was cleansed and bandaged.  No complications were experienced during the 
procedure.  Seven images were obtained and fluoro time was 6 seconds.  
Examination dictated by RONIT Gonzales. Examination was reviewed with 
Nitesh Cortez MD, radiologist at the time of this dictation.               
                                                                                
  [address] [place] [address] PHONE:
[phone] FAX: [phone]  Name .................. : RACHELL MONTELONGO 
      Acct Number.................. : [id number] ROOM. ................. :        
                     MR Number ................... : [id number] Stay type 
............. : O/P                          Discharge Date......... ... : 
20 Admit Date ......... : 20                        Admit Phys 
.................... : MARCO HURT Date of Birth ....... : 2000            
        Family Phys ................... : UNKNOWN CO Phone .................. : 
[phone]                Age ................................ : 20 Film# 
.................. .:[id number]                      Sex 
................................. : F  Unsigned transcriptions are preliminary 
reports and do not represent a medical or legal document       INJECTION FOR HIP
ARTHROGRAM [id number]   COMPLETE:20 13:20                    [address]21             
               REASON FOR EXAM: PAIN       INJECTION FOR HIP ARTHROGRAM 76429   
COMPLETE:20 13:20                    [id number]                             
REASON FOR EXAM: PAIN    ___________________________________ Electronically 
Reviewed and Signed By NITESH CORTEZ MD                        , 20 
09:51, [facility]  Transcribe Initials: SSR, Transcribe Date: 20 14:05, Dictation
Date:   Copy for: MARCO EDWARD Copy for: [facility] REC                         
                                                                         Page 2 
of 2   









          Name      Value     Range     Interpretation Code Description Data Nikki

rce(s) Supporting 

Document(s)

 

                                                                       









                    ID                  Date                Data Source

 

                    [id number]     2020 09:51:00 AM EDT [facility] [address] [place] [address] PHONE: [phone]

FAX: [phone]  Name .................. : RACHELL MONTELONGO        Acct 
Number.................. : [id number] ROOM. ................. :                   
          MR Number ................... : [id number] Stay type ............. : O/P  
                       Discharge Date......... ... : 20 Admit Date .......
.. : 20                        Admit Phys .................... : LARA 
COR Date of Birth ....... : 2000                     Family Phys 
................... : UNKNOWN CO Phone .................. : [phone]        
       Age ................................ : 20 Film# .................. 
.:[id number]                      Sex ................................. : F  
Unsigned transcriptions are preliminary reports and do not represent a medical 
or legal document       INJECTION FOR HIP ARTHROGRAM [id number]   COMPLETE:20 
13:20                    [id number]                             REASON FOR EXAM: PAIN
      INJECTION FOR HIP ARTHROGRAM 32769   COMPLETE:20 13:20              
     [id number]                             REASON FOR EXAM: PAIN     FLUOROSCOPIC 
EXAMINATION OF THE BILATERAL HIPS FOR ARTHROGRAM, 20:  PROCEDURE: The 
benefits and risks of the examination were discussed with the patient. The 
patient has given informed consent for the procedure.  A time out was performed 
confirming the bilateral hips are the proper hip for today's examination.  The 
skin surface was marked using fluoroscopic guidance.  The skin was prepped and 
dressed in normal sterile fashion.  Superficial and deep Lidocaine 
administration was performed with a 25-gauge needle. A 22- gauge spinal needle 
was then placed and advanced under fluoroscopic guidance. The needle was passed 
into the joint space at which time iodinated contrast was administered to 
confirm proper placement. Approximately 5 cc of iodinated contrast was 
administered. Once the proper placement was confirmed, approximately 10 cc of 
1:200 Gadolinium solution was administered. The needle was then removed. The 
skin was cleansed and bandaged.  No complications were experienced during the 
procedure.  Seven images were obtained and fluoro time was 6 seconds.  
Examination dictated by RONIT Gonzales. Examination was reviewed with 
Nitesh Cortez MD, radiologist at the time of this dictation.               
                                                                                
  [address] [place] [address] PHONE:
[phone] FAX: [phone]  Name .................. : RACHELLJULIAN MONTELONGO 
      Acct Number.................. : [id number] ROOM. ................. :        
                     MR Number ................... : [id number] Stay type 
............. : O/P                          Discharge Date......... ... : 
20 Admit Date ......... : 20                        Admit Phys 
.................... : MARCO HURT Date of Birth ....... : 2000            
        Family Phys ................... : UNKNOWN CO Phone .................. : 
[phone]                Age ................................ : 20 Film# 
.................. .:[id number]                      Sex 
................................. : F  Unsigned transcriptions are preliminary 
reports and do not represent a medical or legal document       INJECTION FOR HIP
ARTHROGRAM 10348   COMPLETE:20 13:20                    23216             
               REASON FOR EXAM: PAIN       INJECTION FOR HIP ARTHROGRAM 43597   
COMPLETE:20 13:20                    [id number]                             
REASON FOR EXAM: PAIN    ___________________________________ Electronically 
Reviewed and Signed By NITESH CORTEZ MD                        , 20 
09:51, [facility]  Transcribe Initials: SSR, Transcribe Date: 20 14:05, Dictation
Date:   Copy for: LARA EDWARD Copy for: [facility] REC                         
                                                                         Page 2 
of 2   









          Name      Value     Range     Interpretation Code Description Data Nikki

rce(s) Supporting 

Document(s)

 

                                                                       







                                        Procedure

## 2021-03-16 ENCOUNTER — HOSPITAL ENCOUNTER (EMERGENCY)
Dept: HOSPITAL 53 - M ED | Age: 21
Discharge: HOME | End: 2021-03-16
Payer: COMMERCIAL

## 2021-03-16 VITALS — BODY MASS INDEX: 30 KG/M2 | WEIGHT: 175.71 LBS | HEIGHT: 64 IN

## 2021-03-16 VITALS — DIASTOLIC BLOOD PRESSURE: 70 MMHG | SYSTOLIC BLOOD PRESSURE: 125 MMHG

## 2021-03-16 DIAGNOSIS — Z3A.10: ICD-10-CM

## 2021-03-16 DIAGNOSIS — O99.341: Primary | ICD-10-CM

## 2021-03-16 DIAGNOSIS — F33.9: ICD-10-CM

## 2021-03-16 LAB
ALBUMIN SERPL BCG-MCNC: 3.7 GM/DL (ref 3.2–5.2)
ALT SERPL W P-5'-P-CCNC: 10 U/L (ref 12–78)
APAP SERPL-MCNC: < 2 UG/ML (ref 10–30)
B-HCG SERPL QL: POSITIVE
BILIRUB CONJ SERPL-MCNC: < 0.1 MG/DL (ref 0–0.2)
BILIRUB SERPL-MCNC: 0.2 MG/DL (ref 0.2–1)
BUN SERPL-MCNC: 9 MG/DL (ref 7–18)
CALCIUM SERPL-MCNC: 8.9 MG/DL (ref 8.5–10.1)
CHLORIDE SERPL-SCNC: 105 MEQ/L (ref 98–107)
CO2 SERPL-SCNC: 25 MEQ/L (ref 21–32)
CREAT SERPL-MCNC: 0.58 MG/DL (ref 0.55–1.3)
ETHANOL SERPL-MCNC: < 0.003 % (ref 0–0.01)
GLUCOSE SERPL-MCNC: 92 MG/DL (ref 70–100)
HCT VFR BLD AUTO: 36.4 % (ref 36–47)
HGB BLD-MCNC: 11.8 G/DL (ref 12–15.5)
MCH RBC QN AUTO: 28.9 PG (ref 27–33)
MCHC RBC AUTO-ENTMCNC: 32.4 G/DL (ref 32–36.5)
MCV RBC AUTO: 89 FL (ref 80–96)
PLATELET # BLD AUTO: 231 10^3/UL (ref 150–450)
POTASSIUM SERPL-SCNC: 3.8 MEQ/L (ref 3.5–5.1)
PROT SERPL-MCNC: 7 GM/DL (ref 6.4–8.2)
RBC # BLD AUTO: 4.09 10^6/UL (ref 4–5.4)
SALICYLATES SERPL-MCNC: < 1.7 MG/DL (ref 5–30)
SODIUM SERPL-SCNC: 135 MEQ/L (ref 136–145)
TSH SERPL DL<=0.005 MIU/L-ACNC: 1.05 UIU/ML (ref 0.46–3.98)
WBC # BLD AUTO: 11 10^3/UL (ref 4–10)

## 2021-04-21 ENCOUNTER — HOSPITAL ENCOUNTER (EMERGENCY)
Dept: HOSPITAL 53 - M ED | Age: 21
Discharge: HOME | End: 2021-04-21
Payer: COMMERCIAL

## 2021-04-21 VITALS — HEIGHT: 60 IN | BODY MASS INDEX: 34.58 KG/M2 | WEIGHT: 176.15 LBS

## 2021-04-21 VITALS — DIASTOLIC BLOOD PRESSURE: 66 MMHG | SYSTOLIC BLOOD PRESSURE: 116 MMHG

## 2021-04-21 DIAGNOSIS — O99.712: Primary | ICD-10-CM

## 2021-04-21 DIAGNOSIS — Z3A.15: ICD-10-CM

## 2021-04-21 DIAGNOSIS — L08.9: ICD-10-CM

## 2021-04-24 ENCOUNTER — HOSPITAL ENCOUNTER (EMERGENCY)
Dept: HOSPITAL 53 - M ED | Age: 21
Discharge: HOME | End: 2021-04-24
Payer: COMMERCIAL

## 2021-04-24 VITALS — DIASTOLIC BLOOD PRESSURE: 71 MMHG | SYSTOLIC BLOOD PRESSURE: 122 MMHG

## 2021-04-24 VITALS — WEIGHT: 169.32 LBS | HEIGHT: 64 IN | BODY MASS INDEX: 28.91 KG/M2

## 2021-04-24 DIAGNOSIS — Z3A.00: ICD-10-CM

## 2021-04-24 DIAGNOSIS — O23.40: Primary | ICD-10-CM

## 2021-04-24 DIAGNOSIS — R30.0: ICD-10-CM

## 2021-04-24 LAB
APPEARANCE UR: CLEAR
BACTERIA UR QL AUTO: NEGATIVE
BILIRUB UR QL STRIP.AUTO: NEGATIVE
CHLAMYDIA DNA AMPLIFICATION: NEGATIVE
GLUCOSE UR QL STRIP.AUTO: NEGATIVE MG/DL
HGB UR QL STRIP.AUTO: NEGATIVE
KETONES UR QL STRIP.AUTO: NEGATIVE MG/DL
LEUKOCYTE ESTERASE UR QL STRIP.AUTO: (no result)
MUCOUS THREADS URNS QL MICRO: (no result)
N GONORRHOEA RRNA SPEC QL NAA+PROBE: NEGATIVE
NITRITE UR QL STRIP.AUTO: NEGATIVE
PH UR STRIP.AUTO: 6 UNITS (ref 5–9)
PROT UR QL STRIP.AUTO: NEGATIVE MG/DL
RBC # UR AUTO: 0 /HPF (ref 0–3)
SP GR UR STRIP.AUTO: 1.02 (ref 1–1.03)
SQUAMOUS #/AREA URNS AUTO: 1 /HPF (ref 0–6)
UROBILINOGEN UR QL STRIP.AUTO: 0.2 MG/DL (ref 0–2)
WBC #/AREA URNS AUTO: 18 /HPF (ref 0–3)

## 2021-06-02 ENCOUNTER — HOSPITAL ENCOUNTER (OUTPATIENT)
Dept: HOSPITAL 53 - M LDO | Age: 21
Discharge: HOME | End: 2021-06-02
Attending: OBSTETRICS & GYNECOLOGY
Payer: COMMERCIAL

## 2021-06-02 VITALS — DIASTOLIC BLOOD PRESSURE: 65 MMHG | SYSTOLIC BLOOD PRESSURE: 113 MMHG

## 2021-06-02 VITALS — WEIGHT: 170.2 LBS | HEIGHT: 64 IN | BODY MASS INDEX: 29.06 KG/M2

## 2021-06-02 DIAGNOSIS — Z3A.21: ICD-10-CM

## 2021-06-02 DIAGNOSIS — M70.70: ICD-10-CM

## 2021-06-02 DIAGNOSIS — O26.893: ICD-10-CM

## 2021-06-02 DIAGNOSIS — O23.593: Primary | ICD-10-CM

## 2021-06-02 PROCEDURE — 81001 URINALYSIS AUTO W/SCOPE: CPT

## 2021-06-02 PROCEDURE — 87590 N.GONORRHOEAE DNA DIR PROB: CPT

## 2021-06-02 PROCEDURE — 87490 CHLMYD TRACH DNA DIR PROBE: CPT

## 2021-06-02 PROCEDURE — 87661 TRICHOMONAS VAGINALIS AMPLIF: CPT

## 2021-06-02 NOTE — IPNPDOC
Text Note


Date of Service


The patient was seen on 21.





NOTE


HPI: Mrs. Arminda Beltran is a 21-year-old G1 at 21w3d ega, by LMP 

consistent with first trimester ultrasound, with PNC c/b Frequent UTIs who 

presents to rule-out  labor.





Mrs. Beltran presents reporting progressively worsening pain that she 

attributes to her hip bursitis. However, over the course of the day she has 

noticed lower abdominal/pelvic discomfort and dysuria. No exacerbating or 

relieving factors have been identified.





The patient denies leakage of fluid and vaginal bleeding. She has not started 

feeling the baby move at this point in her pregnancy.





Denies urinary frequency/hematuria, vaginal itching/burning sensations, vaginal 

discharge, or fevers/chills.





Past Medical History:


- Hip bursitis





Past Surgical History:


- Negative.





Medications:


- Prenatal vitamins.





Allergies:


- No Known Drug Allergies.





OB History:


- G1: current partner, spontaneous conception.





GYN History:


- No history of cervical cervical conization.


- h/o chlamydia





Social History:


- 


- [employer]


- Denies alcohol, tobacco and illicit drugs.





PHYSICAL EXAMINATION


Vital signs: normotensive, afebrile.





General: Awake, alert and oriented 3; No apparent distress.


Abdomen: Gravid, soft, nondistended, nontender to palpation.


Back: No CVA tenderness


Lower Extremities: no clubbing, cyanosis or edema.





Sterile Speculum Exam: cervix was visibly closed, with no vaginal bleeding or 

pooling of fluid. Foul smelling yellow/green discharge noted within the vault








NST: Baseline 125 bpm, moderate variability


[place]: No uterine contractions.








Labs:


UA: Negative Leukocyte Esterase; Negative Nitrites; Negative Urine Bacteria; + 

Ketones


UCx: Pending


KOH: No budding hyphae or yeast


Wet Prep: Multiple clue cells, no Trichomonas present.





Gonorrhea/Chlamydia/Trich NAAT: Pending





Assessment: 21-year-old  1 at 21w3d ega with evidence of vaginitis & 

dehydration. Will initiate treatment for vaginitis. Will po hydrate the patient.





Plan:


 Flagyl (metronidazole) 500 mg by mouth twice a day 7 days for bacterial 

vaginosis.


 Patient is to follow-up at her next regularly scheduled OB appointment.


- Return precautions were reviewed with the couple


 All questions answered.





30 minutes spent in counseling.





TStarr REYES Reji, M.D., PhD


[facility]





VS,Adrianna, I+O


VS, Adrianna I+O





Vital Signs








  Date Time  Temp Pulse Resp B/P (MAP) Pulse Ox O2 Delivery O2 Flow Rate FiO2


 


21 19:49 97.4 80 16 113/65 (81)    

















LAWSON PAULA M.D.          2021 20:39

## 2021-07-05 ENCOUNTER — HOSPITAL ENCOUNTER (EMERGENCY)
Dept: HOSPITAL 53 - M ED | Age: 21
LOS: 1 days | Discharge: HOME | End: 2021-07-06
Payer: COMMERCIAL

## 2021-07-05 VITALS — HEIGHT: 64 IN | BODY MASS INDEX: 30.26 KG/M2 | WEIGHT: 177.25 LBS

## 2021-07-05 DIAGNOSIS — F43.21: ICD-10-CM

## 2021-07-05 DIAGNOSIS — O99.342: Primary | ICD-10-CM

## 2021-07-05 DIAGNOSIS — Z3A.27: ICD-10-CM

## 2021-07-05 LAB
ALBUMIN SERPL BCG-MCNC: 3.4 GM/DL (ref 3.2–5.2)
ALT SERPL W P-5'-P-CCNC: 11 U/L (ref 12–78)
AMPHETAMINES UR QL SCN: NEGATIVE
APAP SERPL-MCNC: < 2 UG/ML (ref 10–30)
B-HCG SERPL QL: POSITIVE
BARBITURATES UR QL SCN: NEGATIVE
BENZODIAZ UR QL SCN: NEGATIVE
BILIRUB CONJ SERPL-MCNC: < 0.1 MG/DL (ref 0–0.2)
BILIRUB SERPL-MCNC: 0.3 MG/DL (ref 0.2–1)
BUN SERPL-MCNC: 8 MG/DL (ref 7–18)
BZE UR QL SCN: NEGATIVE
CALCIUM SERPL-MCNC: 8.7 MG/DL (ref 8.5–10.1)
CANNABINOIDS UR QL SCN: NEGATIVE
CHLORIDE SERPL-SCNC: 107 MEQ/L (ref 98–107)
CO2 SERPL-SCNC: 25 MEQ/L (ref 21–32)
CREAT SERPL-MCNC: 0.37 MG/DL (ref 0.55–1.3)
ETHANOL SERPL-MCNC: < 0.003 % (ref 0–0.01)
GLUCOSE SERPL-MCNC: 67 MG/DL (ref 70–100)
HCT VFR BLD AUTO: 32.2 % (ref 36–47)
HGB BLD-MCNC: 10.5 G/DL (ref 12–15.5)
MCH RBC QN AUTO: 29.3 PG (ref 27–33)
MCHC RBC AUTO-ENTMCNC: 32.6 G/DL (ref 32–36.5)
MCV RBC AUTO: 89.9 FL (ref 80–96)
METHADONE UR QL SCN: NEGATIVE
OPIATES UR QL SCN: NEGATIVE
PCP UR QL SCN: NEGATIVE
PLATELET # BLD AUTO: 231 10^3/UL (ref 150–450)
POTASSIUM SERPL-SCNC: 4 MEQ/L (ref 3.5–5.1)
PROT SERPL-MCNC: 6.8 GM/DL (ref 6.4–8.2)
RBC # BLD AUTO: 3.58 10^6/UL (ref 4–5.4)
SALICYLATES SERPL-MCNC: < 1.7 MG/DL (ref 5–30)
SODIUM SERPL-SCNC: 137 MEQ/L (ref 136–145)
TSH SERPL DL<=0.005 MIU/L-ACNC: 0.87 UIU/ML (ref 0.46–3.98)
WBC # BLD AUTO: 8.7 10^3/UL (ref 4–10)

## 2021-07-06 VITALS — DIASTOLIC BLOOD PRESSURE: 52 MMHG | SYSTOLIC BLOOD PRESSURE: 122 MMHG

## 2021-08-05 ENCOUNTER — HOSPITAL ENCOUNTER (EMERGENCY)
Dept: HOSPITAL 53 - M ED | Age: 21
Discharge: HOME | End: 2021-08-05
Payer: COMMERCIAL

## 2021-08-05 VITALS — WEIGHT: 179.37 LBS | HEIGHT: 64 IN | BODY MASS INDEX: 30.62 KG/M2

## 2021-08-05 VITALS — SYSTOLIC BLOOD PRESSURE: 131 MMHG | DIASTOLIC BLOOD PRESSURE: 64 MMHG

## 2021-08-05 DIAGNOSIS — Z3A.30: ICD-10-CM

## 2021-08-05 DIAGNOSIS — J06.9: ICD-10-CM

## 2021-08-05 DIAGNOSIS — O99.513: Primary | ICD-10-CM

## 2021-08-05 DIAGNOSIS — Z20.822: ICD-10-CM

## 2021-08-05 PROCEDURE — 99283 EMERGENCY DEPT VISIT LOW MDM: CPT

## 2021-08-10 ENCOUNTER — HOSPITAL ENCOUNTER (OUTPATIENT)
Dept: HOSPITAL 53 - M LDO | Age: 21
Discharge: HOME | End: 2021-08-10
Attending: ADVANCED PRACTICE MIDWIFE
Payer: COMMERCIAL

## 2021-08-10 VITALS — HEIGHT: 64 IN | BODY MASS INDEX: 30.86 KG/M2 | WEIGHT: 180.78 LBS

## 2021-08-10 VITALS — DIASTOLIC BLOOD PRESSURE: 67 MMHG | SYSTOLIC BLOOD PRESSURE: 125 MMHG

## 2021-08-10 DIAGNOSIS — O23.43: Primary | ICD-10-CM

## 2021-08-10 DIAGNOSIS — Z3A.31: ICD-10-CM

## 2021-08-10 LAB
APPEARANCE UR: CLEAR
BACTERIA UR QL AUTO: NEGATIVE
BILIRUB UR QL STRIP.AUTO: NEGATIVE
GLUCOSE UR QL STRIP.AUTO: NEGATIVE MG/DL
HGB UR QL STRIP.AUTO: NEGATIVE
KETONES UR QL STRIP.AUTO: NEGATIVE MG/DL
LEUKOCYTE ESTERASE UR QL STRIP.AUTO: (no result)
MUCOUS THREADS URNS QL MICRO: (no result)
NITRITE UR QL STRIP.AUTO: NEGATIVE
PH UR STRIP.AUTO: 6 UNITS (ref 5–9)
PROT UR QL STRIP.AUTO: NEGATIVE MG/DL
RBC # UR AUTO: 1 /HPF (ref 0–3)
SP GR UR STRIP.AUTO: 1.01 (ref 1–1.03)
SQUAMOUS #/AREA URNS AUTO: 2 /HPF (ref 0–6)
UROBILINOGEN UR QL STRIP.AUTO: 0.2 MG/DL (ref 0–2)
WBC #/AREA URNS AUTO: 1 /HPF (ref 0–3)

## 2021-08-10 PROCEDURE — 81001 URINALYSIS AUTO W/SCOPE: CPT

## 2021-08-10 PROCEDURE — 87086 URINE CULTURE/COLONY COUNT: CPT

## 2021-08-10 PROCEDURE — 59025 FETAL NON-STRESS TEST: CPT

## 2021-08-10 NOTE — IPNPDOC
Obstetrical Progress Note


Date of Service


Aug 10, 2021





Subjective


22 yo g1 @ 31w2d who presents complaining of uti symptoms with burning when she 

voids, urgency anf frequency. reports that she has hx of reccurent UTI and was 

told to take macrobid every day until she delivers. reports that after her 28 

week appointment someone called her and told her to stop taking microbid and 

they would give her a new presciption for a different drug, but when she went to

pick it up these was nothing at the harmacy. patient reports that she then went 

on vacation out of town for 2 weeks and did not take any meciation. when she 

returned she started having UTI symptoms and she resumed taking the macrobid 

once a day. she has been doing that now for a week and she continues to have the

symptoms. she denies an fevers, chills, nausea or vomiting. she has no other 

concerns.





FHT: 145, MOD SHANNAN,+Accels--CAT I tracing


SVE: DEFFERED


[place]: no contractions





labs


ua and UC: sent





EXAM


Normal vitals


no suprapubic or CVA tenderness





A/P


22 yo g1 @ 31w2d who presents complaining of uti symptoms after skin two weeks 

of her prophylactic dose. hemodynamically stable. normal vitals and reactive 

NST.


WILL Sent home on macrobid 100mg BID FOR 7 DAays, then can go back on daily 

macrobid for supression.


will await UC and will change antibioics as indicated by the the culture.


f/u with SANDRO gaspar previously scheduled.





Objective





Vital Signs








  Date Time  Temp Pulse Resp B/P (MAP) Pulse Ox O2 Delivery O2 Flow Rate FiO2


 


8/10/21 16:19 97.4 70 18 125/67 (86)  Room Air  

















KWADWO DAVIDSON MD           Aug 10, 2021 17:31

## 2021-10-10 ENCOUNTER — HOSPITAL ENCOUNTER (INPATIENT)
Dept: HOSPITAL 53 - M LDO | Age: 21
LOS: 3 days | Discharge: HOME | End: 2021-10-13
Attending: OBSTETRICS & GYNECOLOGY | Admitting: OBSTETRICS & GYNECOLOGY
Payer: COMMERCIAL

## 2021-10-10 ENCOUNTER — HOSPITAL ENCOUNTER (OUTPATIENT)
Dept: HOSPITAL 53 - M LDO | Age: 21
Discharge: HOME | End: 2021-10-10
Attending: OBSTETRICS & GYNECOLOGY
Payer: COMMERCIAL

## 2021-10-10 VITALS — DIASTOLIC BLOOD PRESSURE: 87 MMHG | SYSTOLIC BLOOD PRESSURE: 147 MMHG

## 2021-10-10 VITALS — HEIGHT: 64 IN | BODY MASS INDEX: 31.62 KG/M2 | WEIGHT: 185.19 LBS

## 2021-10-10 VITALS — WEIGHT: 183.2 LBS | BODY MASS INDEX: 31.28 KG/M2 | HEIGHT: 64 IN

## 2021-10-10 VITALS — SYSTOLIC BLOOD PRESSURE: 127 MMHG | DIASTOLIC BLOOD PRESSURE: 76 MMHG

## 2021-10-10 DIAGNOSIS — O60.03: Primary | ICD-10-CM

## 2021-10-10 DIAGNOSIS — O99.013: ICD-10-CM

## 2021-10-10 DIAGNOSIS — Z3A.40: ICD-10-CM

## 2021-10-10 DIAGNOSIS — O99.820: ICD-10-CM

## 2021-10-10 DIAGNOSIS — O48.0: ICD-10-CM

## 2021-10-10 PROCEDURE — 59025 FETAL NON-STRESS TEST: CPT

## 2021-10-10 RX ADMIN — SODIUM CHLORIDE, POTASSIUM CHLORIDE, SODIUM LACTATE AND CALCIUM CHLORIDE SCH MLS/HR: 600; 310; 30; 20 INJECTION, SOLUTION INTRAVENOUS at 23:35

## 2021-10-10 NOTE — HPE
HISTORY AND PHYSICAL



DATE OF ADMISSION:  10/10/2021



HISTORY OF PRESENT ILLNESS:  This lady is a 21-year-old  1, para 0, last

menstrual period (LMP) was January 3, 2021, estimated date of confinement by

early ultrasound of 9 weeks 1 day, is October 10, 2021.  She is at 40 weeks of

gestation with having irregular contractions.  She is booked for induction of

labor 10/17/2021.  



RISK FACTORS:  She has frequent urinary tract infections and she is on Macrobid

daily.  She has gestational anemia and she is GBS positive.  



PHYSICAL EXAMINATION:  On examination, does not appear in any distress.

Symphysis fundus height is 40, vertex presenting.  Category 1 strip with

moderate variability and minimal contractions.  Pelvic examination:  The cervix

is very posterior, 1 cm, minus 3 station, but 80% efface.  No vaginal bleeding

and no discharge.  



ASSESSMENT AND PLAN: She was counseled regarding premature rupture of

membranes, bleeding, contractions 5 to 7 minutes apart, moderate intensity,

when to call provider in order for her to be coming in for labor, otherwise to

keep her induction of labor for 10/17/2021.  She was maintained on a monitor

for an hour. There is no evidence for change.  The patient expressed

understanding of the plan of care. She was discharged undelivered to follow up

with us as per protocol.



On discharge, she was afebrile. Blood pressure was within normal limits, pulse

and respirations. 



The patient plans on returning when in active labor.  







[facility]

## 2021-10-11 VITALS — DIASTOLIC BLOOD PRESSURE: 66 MMHG | SYSTOLIC BLOOD PRESSURE: 121 MMHG

## 2021-10-11 VITALS — SYSTOLIC BLOOD PRESSURE: 127 MMHG | DIASTOLIC BLOOD PRESSURE: 76 MMHG

## 2021-10-11 VITALS — DIASTOLIC BLOOD PRESSURE: 105 MMHG | SYSTOLIC BLOOD PRESSURE: 183 MMHG

## 2021-10-11 VITALS — DIASTOLIC BLOOD PRESSURE: 80 MMHG | SYSTOLIC BLOOD PRESSURE: 131 MMHG

## 2021-10-11 VITALS — SYSTOLIC BLOOD PRESSURE: 192 MMHG | DIASTOLIC BLOOD PRESSURE: 92 MMHG

## 2021-10-11 VITALS — DIASTOLIC BLOOD PRESSURE: 71 MMHG | SYSTOLIC BLOOD PRESSURE: 146 MMHG

## 2021-10-11 VITALS — SYSTOLIC BLOOD PRESSURE: 139 MMHG | DIASTOLIC BLOOD PRESSURE: 79 MMHG

## 2021-10-11 VITALS — DIASTOLIC BLOOD PRESSURE: 89 MMHG | SYSTOLIC BLOOD PRESSURE: 142 MMHG

## 2021-10-11 VITALS — SYSTOLIC BLOOD PRESSURE: 109 MMHG | DIASTOLIC BLOOD PRESSURE: 58 MMHG

## 2021-10-11 VITALS — DIASTOLIC BLOOD PRESSURE: 96 MMHG | SYSTOLIC BLOOD PRESSURE: 177 MMHG

## 2021-10-11 VITALS — DIASTOLIC BLOOD PRESSURE: 88 MMHG | SYSTOLIC BLOOD PRESSURE: 165 MMHG

## 2021-10-11 VITALS — SYSTOLIC BLOOD PRESSURE: 131 MMHG | DIASTOLIC BLOOD PRESSURE: 73 MMHG

## 2021-10-11 VITALS — DIASTOLIC BLOOD PRESSURE: 65 MMHG | SYSTOLIC BLOOD PRESSURE: 113 MMHG

## 2021-10-11 VITALS — DIASTOLIC BLOOD PRESSURE: 65 MMHG | SYSTOLIC BLOOD PRESSURE: 128 MMHG

## 2021-10-11 VITALS — DIASTOLIC BLOOD PRESSURE: 71 MMHG | SYSTOLIC BLOOD PRESSURE: 117 MMHG

## 2021-10-11 VITALS — SYSTOLIC BLOOD PRESSURE: 108 MMHG | DIASTOLIC BLOOD PRESSURE: 61 MMHG

## 2021-10-11 VITALS — DIASTOLIC BLOOD PRESSURE: 70 MMHG | SYSTOLIC BLOOD PRESSURE: 126 MMHG

## 2021-10-11 VITALS — SYSTOLIC BLOOD PRESSURE: 110 MMHG | DIASTOLIC BLOOD PRESSURE: 65 MMHG

## 2021-10-11 VITALS — DIASTOLIC BLOOD PRESSURE: 79 MMHG | SYSTOLIC BLOOD PRESSURE: 107 MMHG

## 2021-10-11 VITALS — DIASTOLIC BLOOD PRESSURE: 61 MMHG | SYSTOLIC BLOOD PRESSURE: 112 MMHG

## 2021-10-11 VITALS — SYSTOLIC BLOOD PRESSURE: 213 MMHG | DIASTOLIC BLOOD PRESSURE: 108 MMHG

## 2021-10-11 VITALS — SYSTOLIC BLOOD PRESSURE: 113 MMHG | DIASTOLIC BLOOD PRESSURE: 55 MMHG

## 2021-10-11 VITALS — DIASTOLIC BLOOD PRESSURE: 68 MMHG | SYSTOLIC BLOOD PRESSURE: 134 MMHG

## 2021-10-11 VITALS — SYSTOLIC BLOOD PRESSURE: 186 MMHG | DIASTOLIC BLOOD PRESSURE: 114 MMHG

## 2021-10-11 VITALS — SYSTOLIC BLOOD PRESSURE: 171 MMHG | DIASTOLIC BLOOD PRESSURE: 101 MMHG

## 2021-10-11 VITALS — SYSTOLIC BLOOD PRESSURE: 131 MMHG | DIASTOLIC BLOOD PRESSURE: 66 MMHG

## 2021-10-11 VITALS — SYSTOLIC BLOOD PRESSURE: 137 MMHG | DIASTOLIC BLOOD PRESSURE: 78 MMHG

## 2021-10-11 VITALS — DIASTOLIC BLOOD PRESSURE: 101 MMHG | SYSTOLIC BLOOD PRESSURE: 181 MMHG

## 2021-10-11 VITALS — DIASTOLIC BLOOD PRESSURE: 68 MMHG | SYSTOLIC BLOOD PRESSURE: 130 MMHG

## 2021-10-11 VITALS — SYSTOLIC BLOOD PRESSURE: 162 MMHG | DIASTOLIC BLOOD PRESSURE: 106 MMHG

## 2021-10-11 VITALS — SYSTOLIC BLOOD PRESSURE: 130 MMHG | DIASTOLIC BLOOD PRESSURE: 59 MMHG

## 2021-10-11 VITALS — SYSTOLIC BLOOD PRESSURE: 106 MMHG | DIASTOLIC BLOOD PRESSURE: 62 MMHG

## 2021-10-11 VITALS — DIASTOLIC BLOOD PRESSURE: 61 MMHG | SYSTOLIC BLOOD PRESSURE: 115 MMHG

## 2021-10-11 VITALS — DIASTOLIC BLOOD PRESSURE: 120 MMHG | SYSTOLIC BLOOD PRESSURE: 201 MMHG

## 2021-10-11 VITALS — DIASTOLIC BLOOD PRESSURE: 82 MMHG | SYSTOLIC BLOOD PRESSURE: 156 MMHG

## 2021-10-11 VITALS — SYSTOLIC BLOOD PRESSURE: 111 MMHG | DIASTOLIC BLOOD PRESSURE: 63 MMHG

## 2021-10-11 VITALS — DIASTOLIC BLOOD PRESSURE: 76 MMHG | SYSTOLIC BLOOD PRESSURE: 140 MMHG

## 2021-10-11 VITALS — SYSTOLIC BLOOD PRESSURE: 180 MMHG | DIASTOLIC BLOOD PRESSURE: 98 MMHG

## 2021-10-11 VITALS — DIASTOLIC BLOOD PRESSURE: 93 MMHG | SYSTOLIC BLOOD PRESSURE: 180 MMHG

## 2021-10-11 VITALS — SYSTOLIC BLOOD PRESSURE: 121 MMHG | DIASTOLIC BLOOD PRESSURE: 65 MMHG

## 2021-10-11 VITALS — DIASTOLIC BLOOD PRESSURE: 62 MMHG | SYSTOLIC BLOOD PRESSURE: 110 MMHG

## 2021-10-11 VITALS — DIASTOLIC BLOOD PRESSURE: 88 MMHG | SYSTOLIC BLOOD PRESSURE: 167 MMHG

## 2021-10-11 VITALS — SYSTOLIC BLOOD PRESSURE: 108 MMHG | DIASTOLIC BLOOD PRESSURE: 63 MMHG

## 2021-10-11 VITALS — SYSTOLIC BLOOD PRESSURE: 132 MMHG | DIASTOLIC BLOOD PRESSURE: 73 MMHG

## 2021-10-11 VITALS — SYSTOLIC BLOOD PRESSURE: 141 MMHG | DIASTOLIC BLOOD PRESSURE: 84 MMHG

## 2021-10-11 VITALS — SYSTOLIC BLOOD PRESSURE: 196 MMHG | DIASTOLIC BLOOD PRESSURE: 93 MMHG

## 2021-10-11 VITALS — SYSTOLIC BLOOD PRESSURE: 111 MMHG | DIASTOLIC BLOOD PRESSURE: 57 MMHG

## 2021-10-11 VITALS — SYSTOLIC BLOOD PRESSURE: 115 MMHG | DIASTOLIC BLOOD PRESSURE: 63 MMHG

## 2021-10-11 VITALS — DIASTOLIC BLOOD PRESSURE: 55 MMHG | SYSTOLIC BLOOD PRESSURE: 121 MMHG

## 2021-10-11 VITALS — SYSTOLIC BLOOD PRESSURE: 175 MMHG | DIASTOLIC BLOOD PRESSURE: 92 MMHG

## 2021-10-11 VITALS — SYSTOLIC BLOOD PRESSURE: 142 MMHG | DIASTOLIC BLOOD PRESSURE: 90 MMHG

## 2021-10-11 VITALS — SYSTOLIC BLOOD PRESSURE: 194 MMHG | DIASTOLIC BLOOD PRESSURE: 90 MMHG

## 2021-10-11 VITALS — SYSTOLIC BLOOD PRESSURE: 192 MMHG | DIASTOLIC BLOOD PRESSURE: 127 MMHG

## 2021-10-11 VITALS — DIASTOLIC BLOOD PRESSURE: 70 MMHG | SYSTOLIC BLOOD PRESSURE: 114 MMHG

## 2021-10-11 VITALS — SYSTOLIC BLOOD PRESSURE: 123 MMHG | DIASTOLIC BLOOD PRESSURE: 79 MMHG

## 2021-10-11 VITALS — SYSTOLIC BLOOD PRESSURE: 118 MMHG | DIASTOLIC BLOOD PRESSURE: 53 MMHG

## 2021-10-11 VITALS — DIASTOLIC BLOOD PRESSURE: 84 MMHG | SYSTOLIC BLOOD PRESSURE: 204 MMHG

## 2021-10-11 VITALS — DIASTOLIC BLOOD PRESSURE: 60 MMHG | SYSTOLIC BLOOD PRESSURE: 116 MMHG

## 2021-10-11 VITALS — DIASTOLIC BLOOD PRESSURE: 59 MMHG | SYSTOLIC BLOOD PRESSURE: 118 MMHG

## 2021-10-11 VITALS — DIASTOLIC BLOOD PRESSURE: 64 MMHG | SYSTOLIC BLOOD PRESSURE: 113 MMHG

## 2021-10-11 VITALS — DIASTOLIC BLOOD PRESSURE: 65 MMHG | SYSTOLIC BLOOD PRESSURE: 132 MMHG

## 2021-10-11 VITALS — SYSTOLIC BLOOD PRESSURE: 126 MMHG | DIASTOLIC BLOOD PRESSURE: 73 MMHG

## 2021-10-11 VITALS — DIASTOLIC BLOOD PRESSURE: 75 MMHG | SYSTOLIC BLOOD PRESSURE: 125 MMHG

## 2021-10-11 VITALS — DIASTOLIC BLOOD PRESSURE: 90 MMHG | SYSTOLIC BLOOD PRESSURE: 169 MMHG

## 2021-10-11 VITALS — DIASTOLIC BLOOD PRESSURE: 71 MMHG | SYSTOLIC BLOOD PRESSURE: 121 MMHG

## 2021-10-11 VITALS — SYSTOLIC BLOOD PRESSURE: 119 MMHG | DIASTOLIC BLOOD PRESSURE: 64 MMHG

## 2021-10-11 VITALS — DIASTOLIC BLOOD PRESSURE: 93 MMHG | SYSTOLIC BLOOD PRESSURE: 200 MMHG

## 2021-10-11 LAB
ALBUMIN SERPL BCG-MCNC: 2.6 GM/DL (ref 3.2–5.2)
ALT SERPL W P-5'-P-CCNC: 8 U/L (ref 12–78)
ALT SERPL W P-5'-P-CCNC: 8 U/L (ref 12–78)
BASE EXCESS BLDCOA CALC-SCNC: -5.3 MMOL/L
BASE EXCESS BLDCOV CALC-SCNC: -3.5 MMOL/L
BILIRUB SERPL-MCNC: 0.4 MG/DL (ref 0.2–1)
BILIRUB SERPL-MCNC: 0.5 MG/DL (ref 0.2–1)
BUN SERPL-MCNC: 4 MG/DL (ref 7–18)
CALCIUM SERPL-MCNC: 7.7 MG/DL (ref 8.5–10.1)
CHLORIDE SERPL-SCNC: 109 MEQ/L (ref 98–107)
CO2 BLDCOA CALC-SCNC: 23.2 MEQ/L
CO2 BLDCOV CALC-SCNC: 23 MEQ/L
CO2 SERPL-SCNC: 25 MEQ/L (ref 21–32)
CREAT SERPL-MCNC: 0.48 MG/DL (ref 0.55–1.3)
CREAT SERPL-MCNC: 0.51 MG/DL (ref 0.55–1.3)
CREAT UR-MCNC: 78.2 MG/DL
GLUCOSE SERPL-MCNC: 79 MG/DL (ref 70–100)
HCO3 STD BLDCOA-SCNC: 18.9 MEQ/L
HCO3 STD BLDCOA-SCNC: 21.7 MEQ/L
HCO3 STD BLDCOV-SCNC: 20.9 MEQ/L
HCO3 STD BLDCOV-SCNC: 21.7 MEQ/L
HCT VFR BLD AUTO: 29.5 % (ref 36–47)
HCT VFR BLD AUTO: 31.2 % (ref 36–47)
HGB BLD-MCNC: 9.3 G/DL (ref 12–15.5)
HGB BLD-MCNC: 9.8 G/DL (ref 12–15.5)
LDH SERPL L TO P-CCNC: 171 U/L (ref 84–246)
MCH RBC QN AUTO: 25.7 PG (ref 27–33)
MCH RBC QN AUTO: 26.1 PG (ref 27–33)
MCHC RBC AUTO-ENTMCNC: 31.4 G/DL (ref 32–36.5)
MCHC RBC AUTO-ENTMCNC: 31.5 G/DL (ref 32–36.5)
MCV RBC AUTO: 81.7 FL (ref 80–96)
MCV RBC AUTO: 82.6 FL (ref 80–96)
PCO2 BLDCOA: 47.4 MMHG
PCO2 BLDCOV: 40.1 MMHG
PH BLDCOA: 7.28 UNITS
PH BLDCOV: 7.35 UNITS
PLATELET # BLD AUTO: 195 10^3/UL (ref 150–450)
PLATELET # BLD AUTO: 224 10^3/UL (ref 150–450)
PO2 BLDCOA: (no result) MMHG
PO2 BLDCOV: 28.6 MMHG
POTASSIUM SERPL-SCNC: 3.8 MEQ/L (ref 3.5–5.1)
PROT SERPL-MCNC: 5.8 GM/DL (ref 6.4–8.2)
PROT UR-MCNC: 20.7 MG/DL (ref 0–12)
RBC # BLD AUTO: 3.57 10^6/UL (ref 4–5.4)
RBC # BLD AUTO: 3.82 10^6/UL (ref 4–5.4)
SAO2 % BLDCOA: 43.4 %
SAO2 % BLDCOV: 70 %
SODIUM SERPL-SCNC: 139 MEQ/L (ref 136–145)
URATE SERPL-MCNC: 3.5 MG/DL (ref 2.6–6)
WBC # BLD AUTO: 11.7 10^3/UL (ref 4–10)
WBC # BLD AUTO: 14 10^3/UL (ref 4–10)

## 2021-10-11 RX ADMIN — ACETAMINOPHEN PRN MG: 500 TABLET ORAL at 09:22

## 2021-10-11 RX ADMIN — Medication SCH MLS/HR: at 15:03

## 2021-10-11 RX ADMIN — SODIUM CHLORIDE, POTASSIUM CHLORIDE, SODIUM LACTATE AND CALCIUM CHLORIDE SCH MLS/HR: 600; 310; 30; 20 INJECTION, SOLUTION INTRAVENOUS at 15:03

## 2021-10-11 RX ADMIN — SODIUM CHLORIDE, POTASSIUM CHLORIDE, SODIUM LACTATE AND CALCIUM CHLORIDE SCH MLS/HR: 600; 310; 30; 20 INJECTION, SOLUTION INTRAVENOUS at 04:30

## 2021-10-11 RX ADMIN — ACETAMINOPHEN PRN MG: 500 TABLET ORAL at 16:17

## 2021-10-11 RX ADMIN — Medication SCH MLS/HR: at 06:41

## 2021-10-11 RX ADMIN — IBUPROFEN PRN MG: 600 TABLET, FILM COATED ORAL at 20:45

## 2021-10-11 RX ADMIN — Medication SCH TAB: at 09:21

## 2021-10-11 NOTE — DN
DELIVERY NOTE



DATE OF DELIVERY: 10/11/2021

TIME OF BIRTH: 



GENDER: 

APGARS: 



LACERATIONS: NONE



ANESTHESIA: EPIDURAL



ESTIMATED BLOOD LOSS: 200 ML



COUNTS: CORRECT



DESCRIPTION OF DELIVERY: This lady is a 21-year-old  1, now para 1 who

was admitted in active labor at term. Throughout her laboring here, her blood

pressures intermittently were in the mid-range to high-range blood pressures to

severe-range blood pressures. She had multiple examinations indicating that

here reflexes were normal, eyegrounds were normal; no right upper quadrant

pain, no edema, and no visual disturbances. Despite that, she had intermittent

labetalol IV push as per protocol. She did have an epidural in place and at

full dilatation had a spontaneous vaginal delivery of a female infant weighing

7 pounds 9 ounces (3430 grams), Apgar scores of 8 and 9 at one and five minutes

respectively; had terminal meconium. Arterial pH's are not available. Placenta

delivered spontaneously thereafter; three vessels, and the cord, membranes and

tissues intact. The anterior, posterior and lateral walls were complete. The

uterus contracted well down on Pitocin. Sphincter was tight. Post delivery, her

blood pressures were in the severe-range blood pressures again, asymptomatic.

40 mg of labetalol were pushed IV. The patient was started on 4 grams of

magnesium to be followed by 2 grams an hour and monitoring intake and output on

an hourly basis. 



In summary, we have a term gestation with intermittent severe-range blood

pressures monitored and treated as if she is preeclamptic. Her

protein/creatinine ratio is 0.126. The rest of her preeclamptic profile was

normal. 









[facility]

MTDD

## 2021-10-11 NOTE — HPEPDOC
Obstetrical History & Physical


General


Date of Admission


Oct 10, 2021 at 23:25


Primary Care Physician:  Chencho Harrison MD





History of Present Illness





                                   Vital Signs








Label Value  Date Time


 


Patient Temperature 98.6 degrees F 10/10/21 2339


 


Temperature Source Temporal 10/10/21 2339


 


Pulse 77 10/10/21 2339


 


Respiratory Rate 18 bpm 10/10/21 2339


 


Blood Pressure Assessment 127/76 (93) 10/10/21 2339





  Source Automatic Cuff (NIBP) 

















Item Value  Date Time


 


White Blood Count 11.7 10^3/uL H 10/10/21 232


 


Red Blood Count 3.82 10^6/uL L 10/10/21 232


 


Hemoglobin 9.8 g/dl L 10/10/21 2329


 


Hematocrit 31.2 % L 10/10/21 2329


 


Mean Corpuscular Volume 81.7 fl 10/10/21 2329


 


Mean Corpuscular Hemoglobin 25.7 pg L 10/10/21 2329


 


Mean Corpuscular Hemoglobin Concent 31.4 g/dl L 10/10/21 2329


 


Red Cell Distribution Width 13.2 % 10/10/21 2329


 


Platelet Count 224 10^3/uL 10/10/21 2329








10/10/21 22 YO  AT 40 WEEKS CONTRACTIONS Q 5 MINUTES MODERATE INTENSITY  

MUCUS  DISCHARGE GBS POSITIVE


Chief Complaint:  Contractions, term


Information Provided By:  Patient


Age:  21


:  1


Term:  0


Pre-term:  0


Abortions:  0


Livin





Prenatal Care


Prenatal Care:  [facility]


Number of Prenatal Visits:  9





Prenatal Dating


Final EDC:  Oct 10, 2021


Final EDC for Daily Update:  Oct 10, 2021


Final EDC by:  LMP


LMP:  Guillaume 3, 2021


1st Trimester Date:  May 24, 2021


Weeks + Days:  12.1


Estimated Date of Confinement:  Oct 10, 2021


EGA at Admission:  40





Antepartum Course


Prenatal Diagnos(e)s


 SPONTANEOUS LABOR AT TERM GBS POSITIVE


Height (inches):  64


Pre-Pregnancy weight (lbs.):  178


Admission Weight (lbs.):  185


Change in Weight (lbs.):  7





Past Medical History


Past Obstetrical History :  


   Past Obstetrical History:  Primgravida


GYN History:  No pertinent history


Past Medical History


Medical History


ANEMIA


Surgical History:  Denies/None





Family History


Significant Family History:  Cancer (DIABETES)


Family History


GRAND MERE BREAST CANCER 


GRAND FATHER  DIABETES





Social History


Social history


NON SMOKER  ACTIVE DUTY  NO VIOLENCE


Marital Status:  


Family situation:  Spouse/partner home


Psychosocial History:  No pertinent psych hx


* Smoker:  non-smoker


Alcohol:  Denies


Drugs:  denies





Abuse Violence Screening


Have you been hit/kicked/slapp:  No


Have you been sexually assault:  No





Prenatal Imunizations


Tdap status:  current


Influenza Status:  current





Allergies


Coded Allergies:  


     No Known Allergies (Unverified , 21)





Medications


Scheduled


Nitrofurantoin Monohyd/M-Cryst (Macrobid 100 mg Capsule) 100 Mg Capsule, 100 MG 

PO BID





Physical Examination


Physical Examination


GENERAL: Alert and oriented times three.


BREAST: .


ABDOMEN: Gravid and non-tender to touch.


FETUS: Is vertex (VTX) by sterile vaginal examination (SVE), fetus is vertex 

(VTX) by Leopold.


HEART RATE: Regular rate and rhythm.


LUNGS: Clear to auscultation (CTA).


EXTREMITIES: No edema. No clonus. Deep tendon reflexes (DTRs) + .


Other physical findings


NORMOCEPHALIC PERRLA CHEST CLEAR TO BASES NO HEART MURMUR NO RUBS OR CLICKS NO 

SOB NO RASHES LESIONS OR PURITIES  NO ARTHRALGIA MYALGIA NO JOINT PAIN NO 

URGENCY FREQUENCY NO NAUSEA VOMITING DIARRHEA CONSTIPATION





Laboratory Data


24H LABS


Laboratory Tests 2


10/10/21 23:30: Serology Scanned Report Hepatitis B Testing


Urine Culture:  No Growth





Pertinent Laboratoy Data


Blood Type:  O+


RBC Antibody Screen:  Negative


HIV:  Negative


Hepatitis B:  Negative


Rapid Plasma Reagin:  Nonreactive


Rubella:  Immune


Varicella:  Immune


Chlamydia/Gonorrhea:  Negative


Group B Streptococcus:  Positive


Cystic Fibrosis:  Negative


Glucose Tolerance Test:  99





Anatomy Ultrasound


Ultrasound Date:  2021


Placenta Location:  Anterior


Normal Anatomy:  Yes


Estimated Fetal Weight (grams):  340





 Steroid Therapy


 Steroid Therapy:  No





Vaginal Examination


Dilation:  4 cm


Effacement:  70%


Station:  -2


Cervical Consistency:  Soft


Cervical Position:  Middle


Fetal Presentation:  Cephalic presentation





Fetal Assessment


Variability:  Moderate


Accelerations:  Present


Decelerations:  None





Tocometer


Contractions:  Yes


Frequency:  every 1-3 min.


Duration:  less than 60 seconds


Strength:  palpated as moderate





Assessment/Plan


Assessment


21-year-old  (G)1 para (P0 at 40 weeks by 9.1 week ultrasound. Presents 

to Labor and Delivery (L&D) ACTIVE GBS POSITIVE





Plan


Admit and orient.


 and consent.


Diet: NPO 


Group B Streptococcus (GBS) [POSITIVE ].


Labs and intravenous (IV) per unit protocol.


Counseled on Pitocin and induction of labor (IOL).


Lactated Ringers (LR): Bolus 1000 mL, then at 125 mL/hr.


Anticipate [normal spontaneous delivery ()].


C-S as appropriate.





Labor and Delivery Counseling


REVIEWED VAGINAL DELIVERY WITH POSSIBLE LACERATIONS AND REQUIRED REPAIR  OF 

VAGINA  URETHRA  BOWEL BLADDER RECTUM USE OF FORCEPS OR VACUUM IF FETAL OR 

MATERNAL INDICATIONS POSSIBLE CEPHALOHEMATOMA BRUISING OR LACERATIONS ADMISSION 

TO NICU POSSIBLE NEED FOR EMERGENCY CS  FOR MATERNAL OR FETAL INDICATIONS IF 

REMOTE FROM DELIVERY. RISK INCLUDE HEMORRHAGE INFECTION PERFORATION DEATH  

REOPERATION REMOTE BLOOD TRANSFUSION  REMOTE HYSTERECTOMY LIFE THREATENING 

BLEEDING EXPRESSED UNDERSTANDING SAFE TO PROCEED











Chencho Harrison MD             Oct 10, 2021 23:52

## 2021-10-12 VITALS — SYSTOLIC BLOOD PRESSURE: 128 MMHG | DIASTOLIC BLOOD PRESSURE: 70 MMHG

## 2021-10-12 VITALS — DIASTOLIC BLOOD PRESSURE: 80 MMHG | SYSTOLIC BLOOD PRESSURE: 140 MMHG

## 2021-10-12 VITALS — DIASTOLIC BLOOD PRESSURE: 79 MMHG | SYSTOLIC BLOOD PRESSURE: 143 MMHG

## 2021-10-12 VITALS — DIASTOLIC BLOOD PRESSURE: 85 MMHG | SYSTOLIC BLOOD PRESSURE: 156 MMHG

## 2021-10-12 VITALS — DIASTOLIC BLOOD PRESSURE: 76 MMHG | SYSTOLIC BLOOD PRESSURE: 134 MMHG

## 2021-10-12 VITALS — SYSTOLIC BLOOD PRESSURE: 123 MMHG | DIASTOLIC BLOOD PRESSURE: 65 MMHG

## 2021-10-12 VITALS — DIASTOLIC BLOOD PRESSURE: 92 MMHG | SYSTOLIC BLOOD PRESSURE: 144 MMHG

## 2021-10-12 LAB
HCT VFR BLD AUTO: 27 % (ref 36–47)
HGB BLD-MCNC: 8.4 G/DL (ref 12–15.5)
MCH RBC QN AUTO: 25.8 PG (ref 27–33)
MCHC RBC AUTO-ENTMCNC: 31.1 G/DL (ref 32–36.5)
MCV RBC AUTO: 82.8 FL (ref 80–96)
PLATELET # BLD AUTO: 203 10^3/UL (ref 150–450)
RBC # BLD AUTO: 3.26 10^6/UL (ref 4–5.4)
WBC # BLD AUTO: 9.1 10^3/UL (ref 4–10)

## 2021-10-12 RX ADMIN — ACETAMINOPHEN PRN MG: 500 TABLET ORAL at 03:53

## 2021-10-12 RX ADMIN — Medication SCH MLS/HR: at 01:35

## 2021-10-12 RX ADMIN — IBUPROFEN PRN MG: 600 TABLET, FILM COATED ORAL at 15:44

## 2021-10-12 RX ADMIN — Medication SCH TAB: at 10:27

## 2021-10-12 NOTE — ECGEPIP
[facility]

                                       

                                       Test Date:    2021-10-12

Pat Name:     DAMEON LOVETT      Department:   

Patient ID:   [id number]                 Room:         [place] [address]

Gender:       Female                   Technician:   albin

:          2000               Requested By: Chencho BERMUDEZ

Order Number: [id number]     Reading MD:   Baljit Paul

                                 Measurements

Intervals                              Axis          

Rate:         65                       P:            -8

AR:           126                      QRS:          25

QRSD:         76                       T:            -12

QT:           384                                    

QTc:          399                                    

                           Interpretive Statements

Normal sinus rhythm

Comparison tracing not on file

Electronically Signed on 10- 21:23:16 EDT by Baljit Paul

## 2021-10-12 NOTE — IPNPDOC
Postpartum Progress Note


Date of Service:  Oct 12, 2021


Postpartum Progress Note


SUBJECT: Arminda Beltran is a 21-year-old  1 now Para 1001 status 

post uncomplicated spontaneous vaginal delivery at -/7 weeks' at approximately 

0347 hours on 2021 of a female 7 pounds 9 ounces (3430 grams) doing well 

postpartum day # 1. She has not yet ambulated, bui is in place and tolerating 

regular diet. Breast feeding without issue. Reports lochia is small. Patient is 

ambulating well. She had unexplained systolics in the 200s during labor and 

delivery, treated with IV labetalol. She received 24 hours post partum magnesium

which was just recently turned off. She denies headaches, vision changes, chest 

pain, shortness of breath, right upper quadrant/epigastric pain. States she 

feels well. 





OBJECTIVE: 


VITAL SIGNS: Within normal limits, afebrile.


Alert and oriented times three.


Breath sounds clear to auscultation.


Heart rate: Regular rate and rhythm, no murmurs, rubs or gallops.


Abdomen: Fundus firm at U-2. Soft, NTTP.


Negative calf tenderness bilaterally.





ASSESSMENT: As above doing well on postpartum day 1. Vitals within normal 

limits, afebrile, hemodynamically stable with no evidence of infection. No 

evidence of preeclampsia. 





PLAN:


1. Discharge to home tomorrow likely (24 hours after magnesium shut-off.)


2. Tylenol and Motrin for pain.


3. Encourage breast feeding and ambulation.


4. undecided for contraception


5. Return in one week for blood pressure check. Routine PP visit in 6 weeks in 

clinic.





VS, I&O, 24H, Adrianna


Vital Signs/I&O





Vital Signs








  Date Time  Temp Pulse Resp B/P (MAP) Pulse Ox O2 Delivery O2 Flow Rate FiO2


 


10/12/21 03:00 97.6 68 17 128/70 (89) 98 Room Air  














I&O- Last 24 Hours up to 6 AM 


 


 10/12/21





 06:00


 


Intake Total 3813 ml


 


Output Total 5000 ml


 


Balance -1187 ml











Laboratory Data


24H LABS


Laboratory Tests 2


10/11/21 10:48: 


Nucleated Red Blood Cells % (auto) 0.0, Anion Gap 5L, Calcium Level 7.7L, 

Magnesium Level 4.2H, Total Bilirubin 0.4, Aspartate Amino Transf (AST/SGOT) 20,

Alanine Aminotransferase (ALT/SGPT) 8L, Alkaline Phosphatase 130H, Total Protein

5.8L, Albumin 2.6L, Albumin/Globulin Ratio 0.8L


CBC/BMP


Laboratory Tests


10/11/21 10:48

















KI GALO DO        Oct 12, 2021 06:19

## 2021-10-13 VITALS — DIASTOLIC BLOOD PRESSURE: 72 MMHG | SYSTOLIC BLOOD PRESSURE: 136 MMHG

## 2021-10-13 VITALS — DIASTOLIC BLOOD PRESSURE: 61 MMHG | SYSTOLIC BLOOD PRESSURE: 131 MMHG

## 2021-10-13 VITALS — SYSTOLIC BLOOD PRESSURE: 136 MMHG | DIASTOLIC BLOOD PRESSURE: 70 MMHG

## 2021-10-13 RX ADMIN — Medication SCH TAB: at 07:36

## 2021-10-13 NOTE — DSES
DISCHARGE SUMMARY



DATE OF ADMISSION:  10/10/2021

DATE OF DISCHARGE:  10/13/2021



BRIEF HISTORY: This lady is a 21-year-old  1, now para 1, who was

admitted in active labor at term, had an epidural in place, spontaneous vaginal

delivery, female infant, 7 pounds 9 ounces (3430 grams), Apgars of 8 and 9 at

one and five minutes respectively, terminal meconium was noted. Arterial pH

7.27, base excess -5.3, venous pH 7.35, base excess -3.5. She developed

elevated severe range blood pressures after delivery and during her epidural.

So she was placed on magnesium sulfate for 24 hours. Her blood pressures

resolved. She became normotensive and presently continues to be normotensive.



Admitting hemoglobin 9.8, hematocrit 31.2 and platelets were 224,000. 

Discharge hemoglobin 8.4, hematocrit 27.0 and platelets were 203,000.

Vitals on discharge: Blood pressure 136/70, respirations 18, pulse 67,

temperature 98.6.



We discussed phlebitis, cystitis, mastitis, endometritis and cellulitis, diet,

exercise, pain management, and perineal, breast and wound care. On discharge,

she was normocephalic, atraumatic. Neck full range of motion. Pupils equal and

reactive to light. Distal pulses are symmetric. No evidence of DVT, PE or

superficial phlebitis. Chest is clear bilaterally to bases, no wheezes or

rhonchi. No CVA tenderness. Abdomen is soft, four quadrant bowel sounds are

noted. Uterus two below, lochia is moderate. No nausea, vomiting, diarrhea, or

constipation. No urgency or frequency. She occasionally gets very anxious, has

a bit of chest pain especially when she is breastfeeding, she gets very anxious

during the formative breastfeeding period.



EKG was normal. Respirations normal. Pulse ox was normal. Therefore, we

attribute this to her anxiety being a first-time mother.



In summary, we have term gestation, delivered a live birth female infant. Plans

are to have her have a one-week blood pressure check at [facility], and

six-week postpartum check. All questions were answered; 20 minute discussion.

The patient was discharged improved.

## 2022-01-26 ENCOUNTER — HOSPITAL ENCOUNTER (EMERGENCY)
Dept: HOSPITAL 53 - M ED | Age: 22
Discharge: HOME | End: 2022-01-26
Payer: COMMERCIAL

## 2022-01-26 VITALS — DIASTOLIC BLOOD PRESSURE: 78 MMHG | SYSTOLIC BLOOD PRESSURE: 123 MMHG

## 2022-01-26 VITALS — BODY MASS INDEX: 28.19 KG/M2 | HEIGHT: 64 IN | WEIGHT: 165.13 LBS

## 2022-01-26 DIAGNOSIS — L66.2: Primary | ICD-10-CM

## 2022-01-26 DIAGNOSIS — L30.9: ICD-10-CM

## 2022-02-14 ENCOUNTER — HOSPITAL ENCOUNTER (EMERGENCY)
Dept: HOSPITAL 53 - M ED | Age: 22
Discharge: LEFT BEFORE DISCHARGE | End: 2022-02-14
Payer: COMMERCIAL

## 2022-02-14 VITALS
BODY MASS INDEX: 29.08 KG/M2 | DIASTOLIC BLOOD PRESSURE: 77 MMHG | HEIGHT: 64 IN | WEIGHT: 170.35 LBS | SYSTOLIC BLOOD PRESSURE: 145 MMHG

## 2022-02-14 DIAGNOSIS — Z53.21: Primary | ICD-10-CM

## 2022-04-03 ENCOUNTER — HOSPITAL ENCOUNTER (EMERGENCY)
Dept: HOSPITAL 53 - M ED | Age: 22
Discharge: HOME | End: 2022-04-03
Payer: COMMERCIAL

## 2022-04-03 VITALS — BODY MASS INDEX: 27.2 KG/M2 | WEIGHT: 159.33 LBS | HEIGHT: 64 IN

## 2022-04-03 VITALS — DIASTOLIC BLOOD PRESSURE: 80 MMHG | SYSTOLIC BLOOD PRESSURE: 134 MMHG

## 2022-04-03 DIAGNOSIS — J02.9: ICD-10-CM

## 2022-04-03 DIAGNOSIS — N39.0: Primary | ICD-10-CM

## 2022-04-03 LAB
HCG UR QL: NEGATIVE
N GONORRHOEA RRNA SPEC QL NAA+PROBE: NEGATIVE
N GONORRHOEA RRNA SPEC QL NAA+PROBE: NEGATIVE

## 2022-04-05 ENCOUNTER — HOSPITAL ENCOUNTER (EMERGENCY)
Dept: HOSPITAL 53 - M ED | Age: 22
Discharge: HOME | End: 2022-04-05
Payer: COMMERCIAL

## 2022-04-05 VITALS — DIASTOLIC BLOOD PRESSURE: 64 MMHG | SYSTOLIC BLOOD PRESSURE: 117 MMHG

## 2022-04-05 VITALS — WEIGHT: 164.69 LBS | BODY MASS INDEX: 28.12 KG/M2 | HEIGHT: 64 IN

## 2022-04-05 DIAGNOSIS — N39.0: ICD-10-CM

## 2022-04-05 DIAGNOSIS — J02.9: Primary | ICD-10-CM

## 2022-08-21 ENCOUNTER — HOSPITAL ENCOUNTER (EMERGENCY)
Dept: HOSPITAL 53 - M ED | Age: 22
Discharge: HOME | End: 2022-08-21
Payer: COMMERCIAL

## 2022-08-21 VITALS
SYSTOLIC BLOOD PRESSURE: 134 MMHG | HEIGHT: 64 IN | BODY MASS INDEX: 27.7 KG/M2 | WEIGHT: 162.26 LBS | DIASTOLIC BLOOD PRESSURE: 91 MMHG

## 2022-08-21 DIAGNOSIS — Z20.2: Primary | ICD-10-CM

## 2022-08-23 ENCOUNTER — HOSPITAL ENCOUNTER (EMERGENCY)
Dept: HOSPITAL 53 - M ED | Age: 22
Discharge: HOME | End: 2022-08-23
Payer: COMMERCIAL

## 2022-08-23 VITALS — WEIGHT: 160.94 LBS | HEIGHT: 64 IN | BODY MASS INDEX: 27.48 KG/M2

## 2022-08-23 VITALS — DIASTOLIC BLOOD PRESSURE: 86 MMHG | SYSTOLIC BLOOD PRESSURE: 149 MMHG

## 2022-08-23 DIAGNOSIS — A60.04: Primary | ICD-10-CM

## 2022-08-23 LAB
AMORPH SED URNS QL MICRO: (no result)
APPEARANCE UR: (no result)
BACTERIA URNS QL MICRO: (no result)
BILIRUB UR QL STRIP: NEGATIVE
COLOR UR: YELLOW
GLUCOSE UR STRIP-MCNC: NEGATIVE MG/DL
HGB UR QL STRIP: NEGATIVE
HYALINE CASTS URNS QL MICRO: (no result) /LPF (ref 0–1)
KETONES UR QL STRIP: NEGATIVE MG/DL
LEUKOCYTE ESTERASE UR QL STRIP: POSITIVE
MUCOUS THREADS URNS QL MICRO: (no result)
N GONORRHOEA RRNA SPEC QL NAA+PROBE: NEGATIVE
NITRITE UR QL STRIP: NEGATIVE
PH UR STRIP: 6 UNITS (ref 5–7)
PROT UR STRIP-MCNC: NEGATIVE MG/DL
RBC #/AREA URNS HPF: (no result) /HPF (ref 0–3)
SP GR UR STRIP: 1.01 (ref 1–1.03)
SQUAMOUS URNS QL MICRO: (no result) /HPF
UROBILINOGEN UR QL STRIP: NORMAL MG/DL
WBC #/AREA URNS HPF: (no result) /HPF (ref 0–3)